# Patient Record
Sex: FEMALE | Race: WHITE | NOT HISPANIC OR LATINO | Employment: FULL TIME | URBAN - METROPOLITAN AREA
[De-identification: names, ages, dates, MRNs, and addresses within clinical notes are randomized per-mention and may not be internally consistent; named-entity substitution may affect disease eponyms.]

---

## 2017-03-13 ENCOUNTER — OFFICE VISIT (OUTPATIENT)
Dept: MEDICAL GROUP | Facility: LAB | Age: 27
End: 2017-03-13
Payer: COMMERCIAL

## 2017-03-13 VITALS
WEIGHT: 136 LBS | HEIGHT: 62 IN | SYSTOLIC BLOOD PRESSURE: 104 MMHG | DIASTOLIC BLOOD PRESSURE: 72 MMHG | OXYGEN SATURATION: 97 % | BODY MASS INDEX: 25.03 KG/M2 | HEART RATE: 80 BPM | TEMPERATURE: 98.6 F | RESPIRATION RATE: 12 BRPM

## 2017-03-13 DIAGNOSIS — Z32.01 POSITIVE PREGNANCY TEST: ICD-10-CM

## 2017-03-13 PROCEDURE — 99213 OFFICE O/P EST LOW 20 MIN: CPT | Performed by: NURSE PRACTITIONER

## 2017-03-13 ASSESSMENT — PATIENT HEALTH QUESTIONNAIRE - PHQ9: CLINICAL INTERPRETATION OF PHQ2 SCORE: 0

## 2017-03-13 NOTE — PROGRESS NOTES
"Chief Complaint   Patient presents with   • Amenorrhea     patient took a home pregnancy test with positive results,       CRISTY Schaeffer is a 25 yo est female here with report of a positive home pregnancy test.  This will be her 2nd pregnancy, she is 2 years post partum with a healthy girl. She is concerned that her OBGYN can not get her in until 5/16/2017.  She is having morning sickness symptoms of nausea and occasional vomiting but reports this is more mild compared to her first pregnancy.  She uses sweta gum for her nausea which helps.  Fatigue is present but mild.  Denies abdominal pain, vaginal bleeding or cramping.  LMP was 1/17/2017 and she had a positive home pregnancy test last week.  Overall states that she is feeling pretty well. She does not take any prescription medication, is a nonsmoker and avoids alcohol. She feels comfortable with what she can eat, drink and what indications over-the-counter she can take in pregnancy. She did not have any complications during her first pregnancy.    Past medical, surgical, family, and social history is reviewed and updated in Epic chart by me today.   Medications and allergies reviewed and updated in Epic chart by me today.     ROS:   As documented in history of present illness above    Exam:  Blood pressure 104/72, pulse 80, temperature 37 °C (98.6 °F), resp. rate 12, height 1.575 m (5' 2\"), weight 61.689 kg (136 lb), last menstrual period 07/07/2016, SpO2 97 %, unknown if currently breastfeeding.  Constitutional: Alert, no distress, plus 3 vital signs  Skin:  Warm, dry, no rashes invisible areas  Eye: Equal, round and reactive, conjunctiva clear  ENMT: Lips without lesions, good dentition, oropharynx clear    Neck: Trachea midline  Respiratory: Unlabored respiration  Cardiovascular: Normal rate and rhythm  Abdomen: Soft, nontender  Psych: Alert, pleasant, well-groomed, normal affect    A/P:  #1- pregnancy:  Concerned regarding appt being in mid May, making her " 17 weeks pregnant at her first appointment. I did call over to [facility] and they confirmed that they cannot see her until May 16 but she is on the waiting list. We reviewed diet, prenatal vitamins and acceptable over-the-counter medications during pregnancy. We also discussed concerning symptoms during pregnancy such as abdominal pain, vaginal bleeding, high fever. She does have access to me via email. She states that she may wait on her appointment in May but is considering reaching out to a different OB/GYN office as it does make her nervous to wait until she is 17 weeks pregnant to see an OB/GYN.  Pregnancy test was not confirmed in our office today as we do not have in office lab privileges yet.

## 2017-03-13 NOTE — MR AVS SNAPSHOT
"Laury Kaminskiparviz   3/13/2017 11:20 AM   Office Visit   MRN: [id number]    Department:  [facility]   Dept Phone:  [phone]    Description:  Female : 1990   Provider:  DENI Arredondo           Reason for Visit     Amenorrhea patient took a home pregnancy test with positive results,      Allergies as of 3/13/2017     Allergen Noted Reactions    Benadryl Allergy 2014   Hives    Pcn [Penicillins] 2009   Vomiting    Promethazine 2012         You were diagnosed with     Positive pregnancy test   [592242]         Vital Signs     Blood Pressure Pulse Temperature Respirations Height Weight    104/72 mmHg 80 37 °C (98.6 °F) 12 1.575 m (5' 2\") 61.689 kg (136 lb)    Body Mass Index Oxygen Saturation Last Menstrual Period Smoking Status          24.87 kg/m2 97% 2016 Never Smoker         Basic Information     Date Of Birth Sex Race Ethnicity Preferred Language    1990 Female White Non- English      Your appointments     May 16, 2017  9:30 AM   Established Patient with Triny Alvarez M.D.   [facility] ([address])    [address] [place] [address], [address]  [facility] [address] [phone]              Problem List              ICD-10-CM Priority Class Noted - Resolved    Shingles B02.9   Unknown - Present    Supervision of normal first pregnancy-IOL 50 9 AM Z34.00   2014 - Present    Asthma, mild persistent J45.30   10/23/2014 - Present      Health Maintenance        Date Due Completion Dates    IMM HEP B VACCINE (1 of 3 - Primary Series) 1990 ---    IMM HEP A VACCINE (1 of 2 - Standard Series) 4/10/1991 ---    IMM VARICELLA (CHICKENPOX) VACCINE (1 of 2 - 2 Dose Adolescent Series) 4/10/2003 ---    IMM HPV VACCINE (2 of 3 - Female 3 Dose Series) 2008    IMM INFLUENZA (1) 2016 ---    PAP SMEAR 2019, 2014, 2012    IMM DTaP/Tdap/Td Vaccine (2 - Td) 2024            "   Current Immunizations     HPV Quadrivalent Vaccine (GARDASIL) 4/25/2008    Pneumococcal polysaccharide vaccine (PPSV-23) 2/11/2015  4:56 AM    Tdap Vaccine 11/12/2014      Below and/or attached are the medications your provider expects you to take. Review all of your home medications and newly ordered medications with your provider and/or pharmacist. Follow medication instructions as directed by your provider and/or pharmacist. Please keep your medication list with you and share with your provider. Update the information when medications are discontinued, doses are changed, or new medications (including over-the-counter products) are added; and carry medication information at all times in the event of emergency situations     Allergies:  BENADRYL ALLERGY - Hives     PCN - Vomiting     PROMETHAZINE - (reactions not documented)               Medications  Valid as of: March 13, 2017 - 12:04 PM    Generic Name Brand Name Tablet Size Instructions for use    Albuterol Sulfate (Nebu Soln) PROVENTIL 2.5mg/3ml 3 ML BY NEBULIZATION ROUTE EVERY FOUR HOURS AS NEEDED FOR SHORTNESS OF BREATH.        Ibuprofen (Tab) MOTRIN 600 MG Take 1 Tab by mouth every 6 hours as needed (Cramping).        Levalbuterol Tartrate (Aerosol) XOPENEX HFA 45 MCG/ACT Inhale 2 Puffs by mouth every four hours as needed for Shortness of Breath.        Levonorgestrel-Ethinyl Estrad (Tab) AVIANE, ALESSE, LESSINA 0.1-20 MG-MCG Take 1 Tab by mouth every day.        Levonorgestrel-Ethinyl Estrad (Tab) NORDETTE 0.15-30 MG-MCG Take 1 Tab by mouth every day.        Misc. Devices (Misc) Breast Pump  Breast Pump        Norethindrone (Tab) MICRONOR 0.35 MG Take 1 Tab by mouth every day. TAKE 1 TAB BY MOUTH EVERY DAY.        Oxycodone-Acetaminophen (Tab) PERCOCET 5-325 MG Take 1 Tab by mouth every four hours as needed for Moderate Pain ((Pain Scale 4-6); If acetaminophen ineffective).        .                 Medicines prescribed today were sent to:      [facility][id number] - [place], [place] - [address] [facility]    [address] [facility] [address]    Phone: [phone] Fax: [phone]    Open 24 Hours?: No      Medication refill instructions:       If your prescription bottle indicates you have medication refills left, it is not necessary to call your provider’s office. Please contact your pharmacy and they will refill your medication.    If your prescription bottle indicates you do not have any refills left, you may request refills at any time through one of the following ways: The online [employer] system (except Urgent Care), by calling your provider’s office, or by asking your pharmacy to contact your provider’s office with a refill request. Medication refills are processed only during regular business hours and may not be available until the next business day. Your provider may request additional information or to have a follow-up visit with you prior to refilling your medication.   *Please Note: Medication refills are assigned a new Rx number when refilled electronically. Your pharmacy may indicate that no refills were authorized even though a new prescription for the same medication is available at the pharmacy. Please request the medicine by name with the pharmacy before contacting your provider for a refill.           [employer] Access Code: Activation code not generated  Current [employer] Status: Active

## 2017-04-10 ENCOUNTER — HOSPITAL ENCOUNTER (OUTPATIENT)
Dept: LAB | Facility: MEDICAL CENTER | Age: 27
End: 2017-04-10
Attending: OBSTETRICS & GYNECOLOGY
Payer: COMMERCIAL

## 2017-04-10 LAB
ABO GROUP BLD: NORMAL
BASOPHILS # BLD AUTO: 0.4 % (ref 0–1.8)
BASOPHILS # BLD: 0.03 K/UL (ref 0–0.12)
BLD GP AB SCN SERPL QL: NORMAL
EOSINOPHIL # BLD AUTO: 0.17 K/UL (ref 0–0.51)
EOSINOPHIL NFR BLD: 2.4 % (ref 0–6.9)
ERYTHROCYTE [DISTWIDTH] IN BLOOD BY AUTOMATED COUNT: 44.5 FL (ref 35.9–50)
HBV SURFACE AG SER QL: NEGATIVE
HCT VFR BLD AUTO: 40.8 % (ref 37–47)
HGB BLD-MCNC: 13.5 G/DL (ref 12–16)
HIV 1+2 AB+HIV1 P24 AG SERPL QL IA: NON REACTIVE
IMM GRANULOCYTES # BLD AUTO: 0.05 K/UL (ref 0–0.11)
IMM GRANULOCYTES NFR BLD AUTO: 0.7 % (ref 0–0.9)
LYMPHOCYTES # BLD AUTO: 1.48 K/UL (ref 1–4.8)
LYMPHOCYTES NFR BLD: 21.3 % (ref 22–41)
MCH RBC QN AUTO: 29.7 PG (ref 27–33)
MCHC RBC AUTO-ENTMCNC: 33.1 G/DL (ref 33.6–35)
MCV RBC AUTO: 89.7 FL (ref 81.4–97.8)
MONOCYTES # BLD AUTO: 0.43 K/UL (ref 0–0.85)
MONOCYTES NFR BLD AUTO: 6.2 % (ref 0–13.4)
NEUTROPHILS # BLD AUTO: 4.79 K/UL (ref 2–7.15)
NEUTROPHILS NFR BLD: 69 % (ref 44–72)
NRBC # BLD AUTO: 0 K/UL
NRBC BLD AUTO-RTO: 0 /100 WBC
PLATELET # BLD AUTO: 155 K/UL (ref 164–446)
PMV BLD AUTO: 11.6 FL (ref 9–12.9)
RBC # BLD AUTO: 4.55 M/UL (ref 4.2–5.4)
RH BLD: NORMAL
RUBV AB SER QL: 159.4 IU/ML
TREPONEMA PALLIDUM IGG+IGM AB [PRESENCE] IN SERUM OR PLASMA BY IMMUNOASSAY: NON REACTIVE
WBC # BLD AUTO: 7 K/UL (ref 4.8–10.8)

## 2017-04-10 PROCEDURE — 86850 RBC ANTIBODY SCREEN: CPT

## 2017-04-10 PROCEDURE — 86901 BLOOD TYPING SEROLOGIC RH(D): CPT

## 2017-04-10 PROCEDURE — 86762 RUBELLA ANTIBODY: CPT

## 2017-04-10 PROCEDURE — 87389 HIV-1 AG W/HIV-1&-2 AB AG IA: CPT

## 2017-04-10 PROCEDURE — 86780 TREPONEMA PALLIDUM: CPT

## 2017-04-10 PROCEDURE — 85025 COMPLETE CBC W/AUTO DIFF WBC: CPT

## 2017-04-10 PROCEDURE — 87340 HEPATITIS B SURFACE AG IA: CPT

## 2017-04-10 PROCEDURE — 87086 URINE CULTURE/COLONY COUNT: CPT | Mod: 91

## 2017-04-10 PROCEDURE — 86900 BLOOD TYPING SEROLOGIC ABO: CPT

## 2017-04-12 LAB
BACTERIA UR CULT: NORMAL
SIGNIFICANT IND 70042: NORMAL
SOURCE SOURCE: NORMAL

## 2017-04-13 LAB
BACTERIA UR CULT: NORMAL
SIGNIFICANT IND 70042: NORMAL
SOURCE SOURCE: NORMAL

## 2017-05-23 ENCOUNTER — INITIAL PRENATAL (OUTPATIENT)
Dept: OBGYN | Facility: CLINIC | Age: 27
End: 2017-05-23
Payer: COMMERCIAL

## 2017-05-23 ENCOUNTER — HOSPITAL ENCOUNTER (OUTPATIENT)
Facility: MEDICAL CENTER | Age: 27
End: 2017-05-23
Attending: OBSTETRICS & GYNECOLOGY
Payer: COMMERCIAL

## 2017-05-23 VITALS — DIASTOLIC BLOOD PRESSURE: 62 MMHG | SYSTOLIC BLOOD PRESSURE: 102 MMHG | WEIGHT: 148 LBS | BODY MASS INDEX: 27.06 KG/M2

## 2017-05-23 DIAGNOSIS — Z34.82 ENCOUNTER FOR SUPERVISION OF OTHER NORMAL PREGNANCY IN SECOND TRIMESTER: ICD-10-CM

## 2017-05-23 PROCEDURE — 87491 CHLMYD TRACH DNA AMP PROBE: CPT

## 2017-05-23 PROCEDURE — 59401 PR NEW OB VISIT: CPT | Performed by: OBSTETRICS & GYNECOLOGY

## 2017-05-23 PROCEDURE — 87591 N.GONORRHOEAE DNA AMP PROB: CPT

## 2017-05-23 NOTE — MR AVS SNAPSHOT
Laury Flores   2017 10:00 AM   Initial Prenatal   MRN: [id number]    Department:  [facility]   Dept Phone:  [phone]    Description:  Female : 1990   Provider:  Triny Alvarez M.D.           Allergies as of 2017     Allergen Noted Reactions    Benadryl Allergy 2014   Hives    Pcn [Penicillins] 2009   Vomiting    Promethazine 2012         You were diagnosed with     Encounter for supervision of other normal pregnancy in second trimester   [[id number]]         Vital Signs     Blood Pressure Weight Smoking Status             102/62 mmHg 67.132 kg (148 lb) Never Smoker          Basic Information     Date Of Birth Sex Race Ethnicity Preferred Language    1990 Female White Non- English      Your appointments     2017 10:30 AM   OB Follow Up with Triny Alvarez M.D.   [address])    [address] [place] [id number] [phone]            2017 10:00 AM   OB Follow Up with Triny Alvarez M.D.   [facility] ([address])    [address] [place] [address], [address] [phone]            Aug 15, 2017 10:00 AM   OB Follow Up with Triny Alvarez M.D.   [address])    [address] [place] [id number] [phone]              Problem List              ICD-10-CM Priority Class Noted - Resolved    Shingles B02.9   Unknown - Present    Supervision of normal first pregnancy-IOL 50 9 AM Z34.00   2014 - Present    Asthma, mild persistent J45.30   10/23/2014 - Present      Health Maintenance        Date Due Completion Dates    IMM HEP B VACCINE (1 of 3 - Primary Series) 1990 ---    IMM HEP A VACCINE (1 of 2 - Standard Series) 4/10/1991 ---    IMM VARICELLA (CHICKENPOX) VACCINE (1 of 2 - 2 Dose Adolescent Series) 4/10/2003 ---    IMM HPV VACCINE (2 of 3 - Female 3 Dose Series)  6/20/2008 4/25/2008    PAP SMEAR 7/8/2019 7/8/2016, 7/29/2014, 1/17/2012    IMM DTaP/Tdap/Td Vaccine (2 - Td) 11/12/2024 11/12/2014            Current Immunizations     HPV Quadrivalent Vaccine (GARDASIL) 4/25/2008    Pneumococcal polysaccharide vaccine (PPSV-23) 2/11/2015  4:56 AM    Tdap Vaccine 11/12/2014      Below and/or attached are the medications your provider expects you to take. Review all of your home medications and newly ordered medications with your provider and/or pharmacist. Follow medication instructions as directed by your provider and/or pharmacist. Please keep your medication list with you and share with your provider. Update the information when medications are discontinued, doses are changed, or new medications (including over-the-counter products) are added; and carry medication information at all times in the event of emergency situations     Allergies:  BENADRYL ALLERGY - Hives     PCN - Vomiting     PROMETHAZINE - (reactions not documented)               Medications  Valid as of: May 23, 2017 - 10:21 AM    Generic Name Brand Name Tablet Size Instructions for use    Albuterol Sulfate (Nebu Soln) PROVENTIL 2.5mg/3ml 3 ML BY NEBULIZATION ROUTE EVERY FOUR HOURS AS NEEDED FOR SHORTNESS OF BREATH.        Ibuprofen (Tab) MOTRIN 600 MG Take 1 Tab by mouth every 6 hours as needed (Cramping).        Levalbuterol Tartrate (Aerosol) XOPENEX HFA 45 MCG/ACT Inhale 2 Puffs by mouth every four hours as needed for Shortness of Breath.        Levonorgestrel-Ethinyl Estrad (Tab) AVIANE, ALESSE, LESSINA 0.1-20 MG-MCG Take 1 Tab by mouth every day.        Levonorgestrel-Ethinyl Estrad (Tab) NORDETTE 0.15-30 MG-MCG Take 1 Tab by mouth every day.        Misc. Devices (Misc) Breast Pump  Breast Pump        Norethindrone (Tab) MICRONOR 0.35 MG Take 1 Tab by mouth every day. TAKE 1 TAB BY MOUTH EVERY DAY.        Oxycodone-Acetaminophen (Tab) PERCOCET 5-325 MG Take 1 Tab by mouth every four hours as needed for  Moderate Pain ((Pain Scale 4-6); If acetaminophen ineffective).        .                 Medicines prescribed today were sent to:     [facility] #[id number] - [place], [place] - [address] [facility] [address]    [address] [facility] [address]    Phone: [phone] Fax: [phone]    Open 24 Hours?: No      Medication refill instructions:       If your prescription bottle indicates you have medication refills left, it is not necessary to call your provider’s office. Please contact your pharmacy and they will refill your medication.    If your prescription bottle indicates you do not have any refills left, you may request refills at any time through one of the following ways: The online [employer] system (except Urgent Care), by calling your provider’s office, or by asking your pharmacy to contact your provider’s office with a refill request. Medication refills are processed only during regular business hours and may not be available until the next business day. Your provider may request additional information or to have a follow-up visit with you prior to refilling your medication.   *Please Note: Medication refills are assigned a new Rx number when refilled electronically. Your pharmacy may indicate that no refills were authorized even though a new prescription for the same medication is available at the pharmacy. Please request the medicine by name with the pharmacy before contacting your provider for a refill.        Your To Do List     Future Labs/Procedures Complete By Expires    AFP TETRA  As directed 5/23/2018    Chlamydia/GC PCR Urine or Swab  As directed 5/23/2018    US-OB 2ND 3RD TRI COMPLETE  As directed 5/23/2018      Instructions    New ob            [employer] Access Code: Activation code not generated  Current [employer] Status: Active

## 2017-05-23 NOTE — PROGRESS NOTES
Pt here for NOB. No fetal movement yet. Denies LOF, VB.  [facility]  Per pt she has confirmation US and blood work. Will request records.  Needs pap + fetal anatomy scan

## 2017-05-23 NOTE — PROGRESS NOTES
Laury Flores is a 27 y.o.  at 18w4d here today for obstetrical visit.  Patient is without complaints.    She reports good fetal movement.  She denies vaginal bleeding.  She denies rupture of membranes.  She denies contractions.     has Shingles; Supervision of normal first pregnancy-IOL 50 9 AM; and Asthma, mild persistent on her problem list.    Past medical history is reviewed and updated  Past surgical history is reviewed and updated  Medications reviewed and updated  Allergies reviewed and updated  Social history reviewed and updated  Family history reviewed and updated      Heart regular rate and rhythm  Lungs clear to auscultation  Breasts nontender with no dominant masses  Abdomen gravid and soft  PELVIC EXAM:  Normal external genitalia  Spec: cervix has no lesions, minimal whitish discharge  Vaginal wall no lesions  BME: cervix - closed and firm, non-tender            Uterus -18 weeks, fetal heart tones 160s            Adnexae - no masses, non-tender      A/P IUP at 18w4d  AFP ordered  1 hour glucola   Rhogam o neg  GBS     F/U in 4 weeks

## 2017-05-24 ENCOUNTER — TELEPHONE (OUTPATIENT)
Dept: OBGYN | Facility: CLINIC | Age: 27
End: 2017-05-24

## 2017-05-24 LAB
C TRACH DNA SPEC QL NAA+PROBE: NEGATIVE
N GONORRHOEA DNA SPEC QL NAA+PROBE: NEGATIVE
SPECIMEN SOURCE: NORMAL

## 2017-05-30 ENCOUNTER — HOSPITAL ENCOUNTER (OUTPATIENT)
Dept: LAB | Facility: MEDICAL CENTER | Age: 27
End: 2017-05-30
Attending: OBSTETRICS & GYNECOLOGY
Payer: COMMERCIAL

## 2017-05-30 DIAGNOSIS — Z34.82 ENCOUNTER FOR SUPERVISION OF OTHER NORMAL PREGNANCY IN SECOND TRIMESTER: ICD-10-CM

## 2017-05-30 PROCEDURE — 81511 FTL CGEN ABNOR FOUR ANAL: CPT

## 2017-05-30 PROCEDURE — 36415 COLL VENOUS BLD VENIPUNCTURE: CPT

## 2017-06-02 LAB
# FETUSES US: NORMAL
AFP MOM SERPL: 1.41
AFP SERPL-MCNC: 76 NG/ML
AGE - REPORTED: 27.5 YR
GA METHOD: NORMAL
GA: 19.57 WEEKS
HCG MOM SERPL: 1.42
HCG SERPL-ACNC: NORMAL IU/L
IDDM PATIENT QL: NO
INHIBIN A MOM SERPL: 1.13
INHIBIN A SERPL-MCNC: 194 PG/ML
INTEGRATED SCN PATIENT-IMP: NORMAL
PATHOLOGY STUDY: NORMAL
U ESTRIOL MOM SERPL: 0.83
U ESTRIOL SERPL-MCNC: 1.67 NG/ML

## 2017-06-20 ENCOUNTER — ROUTINE PRENATAL (OUTPATIENT)
Dept: OBGYN | Facility: CLINIC | Age: 27
End: 2017-06-20
Payer: COMMERCIAL

## 2017-06-20 ENCOUNTER — HOSPITAL ENCOUNTER (OUTPATIENT)
Dept: RADIOLOGY | Facility: MEDICAL CENTER | Age: 27
End: 2017-06-20
Attending: OBSTETRICS & GYNECOLOGY
Payer: COMMERCIAL

## 2017-06-20 VITALS — SYSTOLIC BLOOD PRESSURE: 110 MMHG | WEIGHT: 154 LBS | DIASTOLIC BLOOD PRESSURE: 70 MMHG | BODY MASS INDEX: 28.16 KG/M2

## 2017-06-20 DIAGNOSIS — Z34.82 ENCOUNTER FOR SUPERVISION OF OTHER NORMAL PREGNANCY IN SECOND TRIMESTER: ICD-10-CM

## 2017-06-20 PROBLEM — Z34.92 ENCOUNTER FOR SUPERVISION OF NORMAL PREGNANCY IN SECOND TRIMESTER: Status: ACTIVE | Noted: 2017-06-20

## 2017-06-20 PROCEDURE — 90040 PR PRENATAL FOLLOW UP: CPT | Performed by: OBSTETRICS & GYNECOLOGY

## 2017-06-20 PROCEDURE — 76805 OB US >/= 14 WKS SNGL FETUS: CPT

## 2017-06-20 NOTE — MR AVS SNAPSHOT
Laury Flores   2017 10:30 AM   Routine Prenatal   MRN: [id number]    [facility]   Dept Phone:  [phone]    Description:  Female : 1990   Provider:  Triny Alvarez M.D.           Allergies as of 2017     Allergen Noted Reactions    Benadryl Allergy 2014   Hives    Pcn [Penicillins] 2009   Vomiting    Promethazine 2012         You were diagnosed with     Encounter for supervision of other normal pregnancy in second trimester   [[id number]]         Vital Signs     Blood Pressure Weight Last Menstrual Period Smoking Status          110/70 mmHg 69.854 kg (154 lb) 2017 (Exact Date) Never Smoker         Basic Information     Date Of Birth Sex Race Ethnicity Preferred Language    1990 Female White Non- English      Your appointments     2017 10:00 AM   OB Follow Up with Triny Alvarez M.D.   [facility] ([address])    [address] [place] [phone]            Aug 15, 2017  3:45 PM   OB Follow Up with Triny Alvarez M.D.   [facility] ([address])    [address] [place] [phone]              Problem List              ICD-10-CM Priority Class Noted - Resolved    Shingles B02.9   Unknown - Present    Asthma, mild persistent J45.30   10/23/2014 - Present    Encounter for supervision of normal pregnancy in second trimester Z34.92   2017 - Present      Health Maintenance        Date Due Completion Dates    IMM HEP B VACCINE (1 of 3 - Primary Series) 1990 ---    IMM HEP A VACCINE (1 of 2 - Standard Series) 4/10/1991 ---    IMM VARICELLA (CHICKENPOX) VACCINE (1 of 2 - 2 Dose Adolescent Series) 4/10/2003 ---    IMM HPV VACCINE (2 of 3 - Female 3 Dose Series) 2008    PAP SMEAR 2019, 2014, 2012    IMM DTaP/Tdap/Td Vaccine (2 - Td) 2024               Current Immunizations     HPV Quadrivalent Vaccine (GARDASIL) 4/25/2008    Pneumococcal polysaccharide vaccine (PPSV-23) 2/11/2015  4:56 AM    Tdap Vaccine 11/12/2014      Below and/or attached are the medications your provider expects you to take. Review all of your home medications and newly ordered medications with your provider and/or pharmacist. Follow medication instructions as directed by your provider and/or pharmacist. Please keep your medication list with you and share with your provider. Update the information when medications are discontinued, doses are changed, or new medications (including over-the-counter products) are added; and carry medication information at all times in the event of emergency situations     Allergies:  BENADRYL ALLERGY - Hives     PCN - Vomiting     PROMETHAZINE - (reactions not documented)               Medications  Valid as of: June 20, 2017 - 11:14 AM    Generic Name Brand Name Tablet Size Instructions for use    Albuterol Sulfate (Nebu Soln) PROVENTIL 2.5mg/3ml 3 ML BY NEBULIZATION ROUTE EVERY FOUR HOURS AS NEEDED FOR SHORTNESS OF BREATH.        Ibuprofen (Tab) MOTRIN 600 MG Take 1 Tab by mouth every 6 hours as needed (Cramping).        Levalbuterol Tartrate (Aerosol) XOPENEX HFA 45 MCG/ACT Inhale 2 Puffs by mouth every four hours as needed for Shortness of Breath.        Levonorgestrel-Ethinyl Estrad (Tab) AVIANE, ALESSE, LESSINA 0.1-20 MG-MCG Take 1 Tab by mouth every day.        Levonorgestrel-Ethinyl Estrad (Tab) NORDETTE 0.15-30 MG-MCG Take 1 Tab by mouth every day.        Misc. Devices (Misc) Breast Pump  Breast Pump        Norethindrone (Tab) MICRONOR 0.35 MG Take 1 Tab by mouth every day. TAKE 1 TAB BY MOUTH EVERY DAY.        Oxycodone-Acetaminophen (Tab) PERCOCET 5-325 MG Take 1 Tab by mouth every four hours as needed for Moderate Pain ((Pain Scale 4-6); If acetaminophen ineffective).        .                 Medicines prescribed today were sent to:     [id number] - [place], [place] - [address]    [address] [facility] [address]    Phone: [phone] Fax: [phone]    Open 24 Hours?: No      Medication refill instructions:       If your prescription bottle indicates you have medication refills left, it is not necessary to call your provider’s office. Please contact your pharmacy and they will refill your medication.    If your prescription bottle indicates you do not have any refills left, you may request refills at any time through one of the following ways: The online [employer] system (except Urgent Care), by calling your provider’s office, or by asking your pharmacy to contact your provider’s office with a refill request. Medication refills are processed only during regular business hours and may not be available until the next business day. Your provider may request additional information or to have a follow-up visit with you prior to refilling your medication.   *Please Note: Medication refills are assigned a new Rx number when refilled electronically. Your pharmacy may indicate that no refills were authorized even though a new prescription for the same medication is available at the pharmacy. Please request the medicine by name with the pharmacy before contacting your provider for a refill.           [employer] Access Code: Activation code not generated  Current [employer] Status: Active

## 2017-06-21 ENCOUNTER — DATING (OUTPATIENT)
Dept: OBGYN | Facility: CLINIC | Age: 27
End: 2017-06-21

## 2017-07-18 ENCOUNTER — ROUTINE PRENATAL (OUTPATIENT)
Dept: OBGYN | Facility: CLINIC | Age: 27
End: 2017-07-18
Payer: COMMERCIAL

## 2017-07-18 ENCOUNTER — TELEPHONE (OUTPATIENT)
Dept: OBGYN | Facility: CLINIC | Age: 27
End: 2017-07-18

## 2017-07-18 VITALS
WEIGHT: 160.4 LBS | HEIGHT: 62 IN | SYSTOLIC BLOOD PRESSURE: 108 MMHG | BODY MASS INDEX: 29.52 KG/M2 | DIASTOLIC BLOOD PRESSURE: 60 MMHG

## 2017-07-18 DIAGNOSIS — Z34.82 ENCOUNTER FOR SUPERVISION OF OTHER NORMAL PREGNANCY IN SECOND TRIMESTER: ICD-10-CM

## 2017-07-18 DIAGNOSIS — O44.02 PLACENTA PREVIA ANTEPARTUM IN SECOND TRIMESTER: ICD-10-CM

## 2017-07-18 PROCEDURE — 90040 PR PRENATAL FOLLOW UP: CPT | Performed by: OBSTETRICS & GYNECOLOGY

## 2017-07-18 NOTE — MR AVS SNAPSHOT
"Laury Flores   2017 10:00 AM   Routine Prenatal   MRN: [id number]    Department:  [facility]   Dept Phone:  [phone]    Description:  Female : 1990   Provider:  Triny Alvarez M.D.           Allergies as of 2017     Allergen Noted Reactions    Benadryl Allergy 2014   Hives    Pcn [Penicillins] 2009   Vomiting    Promethazine 2012         You were diagnosed with     Encounter for supervision of other normal pregnancy in second trimester   [8227470]       Placenta previa antepartum in second trimester   [[id number]]         Vital Signs     Blood Pressure Height Weight Body Mass Index Last Menstrual Period Smoking Status    108/60 mmHg 1.575 m (5' 2\") 72.757 kg (160 lb 6.4 oz) 29.33 kg/m2 2017 (Exact Date) Never Smoker       Basic Information     Date Of Birth Sex Race Ethnicity Preferred Language    1990 Female White Non- English      Your appointments     Aug 01, 2017  9:00 AM   [facility] Non Provider with OBGYN E2 [facility] ([address])    [address] [place] [address], [address]  [facility] [id number] [phone]            Aug 15, 2017  3:45 PM   OB Follow Up with Triny Alvarez M.D.   [facility] ([address])    [address] [place] [address], [address]  [facility] [id number] [phone]              Problem List              ICD-10-CM Priority Class Noted - Resolved    Shingles B02.9   Unknown - Present    Asthma, mild persistent J45.30   10/23/2014 - Present    Encounter for supervision of normal pregnancy in second trimester Z34.92   2017 - Present    Placenta previa antepartum in second trimester O44.02   2017 - Present      Health Maintenance        Date Due Completion Dates    IMM HEP B VACCINE (1 of 3 - Primary Series) 1990 ---    IMM HEP A VACCINE (1 of 2 - Standard Series) 4/10/1991 ---    IMM VARICELLA (CHICKENPOX) VACCINE (1 of 2 - 2 Dose Adolescent Series) 4/10/2003 ---    IMM " HPV VACCINE (2 of 3 - Female 3 Dose Series) 6/20/2008 4/25/2008    IMM INFLUENZA (1) 9/1/2017 ---    PAP SMEAR 7/8/2019 7/8/2016, 7/29/2014, 1/17/2012    IMM DTaP/Tdap/Td Vaccine (2 - Td) 11/12/2024 11/12/2014            Current Immunizations     HPV Quadrivalent Vaccine (GARDASIL) 4/25/2008    Pneumococcal polysaccharide vaccine (PPSV-23) 2/11/2015  4:56 AM    Tdap Vaccine 11/12/2014      Below and/or attached are the medications your provider expects you to take. Review all of your home medications and newly ordered medications with your provider and/or pharmacist. Follow medication instructions as directed by your provider and/or pharmacist. Please keep your medication list with you and share with your provider. Update the information when medications are discontinued, doses are changed, or new medications (including over-the-counter products) are added; and carry medication information at all times in the event of emergency situations     Allergies:  BENADRYL ALLERGY - Hives     PCN - Vomiting     PROMETHAZINE - (reactions not documented)               Medications  Valid as of: July 18, 2017 - 10:41 AM    Generic Name Brand Name Tablet Size Instructions for use    Albuterol Sulfate (Nebu Soln) PROVENTIL 2.5mg/3ml 3 ML BY NEBULIZATION ROUTE EVERY FOUR HOURS AS NEEDED FOR SHORTNESS OF BREATH.        Ibuprofen (Tab) MOTRIN 600 MG Take 1 Tab by mouth every 6 hours as needed (Cramping).        Levalbuterol Tartrate (Aerosol) XOPENEX HFA 45 MCG/ACT Inhale 2 Puffs by mouth every four hours as needed for Shortness of Breath.        Levonorgestrel-Ethinyl Estrad (Tab) AVIANE, ALESSE, LESSINA 0.1-20 MG-MCG Take 1 Tab by mouth every day.        Levonorgestrel-Ethinyl Estrad (Tab) NORDETTE 0.15-30 MG-MCG Take 1 Tab by mouth every day.        Misc. Devices (Misc) Breast Pump  Breast Pump        Norethindrone (Tab) MICRONOR 0.35 MG Take 1 Tab by mouth every day. TAKE 1 TAB BY MOUTH EVERY DAY.        Oxycodone-Acetaminophen  (Tab) PERCOCET 5-325 MG Take 1 Tab by mouth every four hours as needed for Moderate Pain ((Pain Scale 4-6); If acetaminophen ineffective).        .                 Medicines prescribed today were sent to:     [facility]/PHARMACY #[id number] - [facility] [place] - [address]    [address] [address]    Phone: [phone] Fax: [phone]    Open 24 Hours?: No      Medication refill instructions:       If your prescription bottle indicates you have medication refills left, it is not necessary to call your provider’s office. Please contact your pharmacy and they will refill your medication.    If your prescription bottle indicates you do not have any refills left, you may request refills at any time through one of the following ways: The online [employer] system (except Urgent Care), by calling your provider’s office, or by asking your pharmacy to contact your provider’s office with a refill request. Medication refills are processed only during regular business hours and may not be available until the next business day. Your provider may request additional information or to have a follow-up visit with you prior to refilling your medication.   *Please Note: Medication refills are assigned a new Rx number when refilled electronically. Your pharmacy may indicate that no refills were authorized even though a new prescription for the same medication is available at the pharmacy. Please request the medicine by name with the pharmacy before contacting your provider for a refill.        Your To Do List     Future Labs/Procedures Complete By Expires    CBC WITHOUT DIFFERENTIAL  As directed 1/15/2018    T.PALLIDUM AB EIA  As directed 7/18/2018         Hub[employer]illa Access Code: Activation code not generated  Current Hub[employer]illa Status: Active

## 2017-07-18 NOTE — PROGRESS NOTES
Laury Flores is a 27 y.o.  at 25w6d here today for obstetrical visit.  Patient is without complaints.    She reports good fetal movement.  She denies vaginal bleeding.  She denies rupture of membranes.  She denies contractions.     has Shingles; Asthma, mild persistent; and Encounter for supervision of normal pregnancy in second trimester on her problem list.    Review of US partial previa written in findings but not impression will have radiology re-read for clarification  Addendum from ultrasound report shows low-lying placenta within 1 cm cervical  D/c with patient pelvic rest and possible repeat US  Needs rhogam in two weeks    A/P IUP at 25w6d  AFP done  1 hour glucola ordered  Rhogam 2 weeks  GBS   F/U in 4 weeks

## 2017-07-19 NOTE — TELEPHONE ENCOUNTER
Per Dr. Alvarez, I called the pt and explained she needs to be on pelvic rest, US in 3 weeks, no placenta previa but low lying. Also told pt about 24 week labs for gdm. Pt agrees and will have labs done tomorrow and call to schedule US

## 2017-07-25 ENCOUNTER — HOSPITAL ENCOUNTER (OUTPATIENT)
Dept: LAB | Facility: MEDICAL CENTER | Age: 27
End: 2017-07-25
Attending: OBSTETRICS & GYNECOLOGY
Payer: COMMERCIAL

## 2017-07-25 DIAGNOSIS — Z34.82 ENCOUNTER FOR SUPERVISION OF OTHER NORMAL PREGNANCY IN SECOND TRIMESTER: ICD-10-CM

## 2017-07-25 LAB
ERYTHROCYTE [DISTWIDTH] IN BLOOD BY AUTOMATED COUNT: 39.9 FL (ref 35.9–50)
HCT VFR BLD AUTO: 37 % (ref 37–47)
HGB BLD-MCNC: 11.9 G/DL (ref 12–16)
MCH RBC QN AUTO: 28.8 PG (ref 27–33)
MCHC RBC AUTO-ENTMCNC: 32.2 G/DL (ref 33.6–35)
MCV RBC AUTO: 89.6 FL (ref 81.4–97.8)
PLATELET # BLD AUTO: 138 K/UL (ref 164–446)
PMV BLD AUTO: 10.7 FL (ref 9–12.9)
RBC # BLD AUTO: 4.13 M/UL (ref 4.2–5.4)
TREPONEMA PALLIDUM IGG+IGM AB [PRESENCE] IN SERUM OR PLASMA BY IMMUNOASSAY: NON REACTIVE
WBC # BLD AUTO: 9.3 K/UL (ref 4.8–10.8)

## 2017-07-25 PROCEDURE — 82950 GLUCOSE TEST: CPT

## 2017-07-25 PROCEDURE — 85027 COMPLETE CBC AUTOMATED: CPT

## 2017-07-25 PROCEDURE — 86780 TREPONEMA PALLIDUM: CPT

## 2017-07-25 PROCEDURE — 36415 COLL VENOUS BLD VENIPUNCTURE: CPT

## 2017-07-26 LAB — GLUCOSE 1H P 50 G GLC PO SERPL-MCNC: 145 MG/DL (ref 70–139)

## 2017-07-28 ENCOUNTER — TELEPHONE (OUTPATIENT)
Dept: OBGYN | Facility: CLINIC | Age: 27
End: 2017-07-28

## 2017-07-31 ENCOUNTER — TELEPHONE (OUTPATIENT)
Dept: OBGYN | Facility: MEDICAL CENTER | Age: 27
End: 2017-07-31

## 2017-07-31 DIAGNOSIS — O99.810 ABNORMAL GLUCOSE TOLERANCE IN PREGNANCY: ICD-10-CM

## 2017-07-31 NOTE — TELEPHONE ENCOUNTER
----- Message from Triny Alvarez M.D. sent at 7/28/2017 12:11 AM PDT -----  Patient needs three-hour GTT

## 2017-08-01 ENCOUNTER — APPOINTMENT (OUTPATIENT)
Dept: OBGYN | Facility: CLINIC | Age: 27
End: 2017-08-01
Payer: COMMERCIAL

## 2017-08-02 ENCOUNTER — NON-PROVIDER VISIT (OUTPATIENT)
Dept: OBGYN | Facility: CLINIC | Age: 27
End: 2017-08-02
Payer: COMMERCIAL

## 2017-08-02 DIAGNOSIS — Z29.13 NEED FOR RHOGAM DUE TO RH NEGATIVE MOTHER: ICD-10-CM

## 2017-08-07 ENCOUNTER — HOSPITAL ENCOUNTER (OUTPATIENT)
Dept: RADIOLOGY | Facility: MEDICAL CENTER | Age: 27
End: 2017-08-07
Attending: OBSTETRICS & GYNECOLOGY
Payer: COMMERCIAL

## 2017-08-07 ENCOUNTER — TELEPHONE (OUTPATIENT)
Dept: OBGYN | Facility: CLINIC | Age: 27
End: 2017-08-07

## 2017-08-07 DIAGNOSIS — O44.02 PLACENTA PREVIA ANTEPARTUM IN SECOND TRIMESTER: ICD-10-CM

## 2017-08-07 PROCEDURE — 76817 TRANSVAGINAL US OBSTETRIC: CPT

## 2017-08-08 ENCOUNTER — TELEPHONE (OUTPATIENT)
Dept: OBGYN | Facility: CLINIC | Age: 27
End: 2017-08-08

## 2017-08-08 ENCOUNTER — HOSPITAL ENCOUNTER (OUTPATIENT)
Dept: LAB | Facility: MEDICAL CENTER | Age: 27
End: 2017-08-08
Attending: OBSTETRICS & GYNECOLOGY
Payer: COMMERCIAL

## 2017-08-08 DIAGNOSIS — O99.810 ABNORMAL GLUCOSE TOLERANCE IN PREGNANCY: ICD-10-CM

## 2017-08-08 LAB
GLUCOSE 1H P CHAL SERPL-MCNC: 153 MG/DL (ref 65–180)
GLUCOSE 2H P CHAL SERPL-MCNC: 156 MG/DL (ref 65–155)
GLUCOSE 3H P CHAL SERPL-MCNC: 140 MG/DL (ref 65–140)
GLUCOSE BS SERPL-MCNC: 81 MG/DL (ref 65–95)

## 2017-08-08 PROCEDURE — 36415 COLL VENOUS BLD VENIPUNCTURE: CPT

## 2017-08-08 PROCEDURE — 82951 GLUCOSE TOLERANCE TEST (GTT): CPT

## 2017-08-08 PROCEDURE — 82952 GTT-ADDED SAMPLES: CPT

## 2017-08-09 NOTE — TELEPHONE ENCOUNTER
Called and spoke with the pt and informed her of normal US findings. Pt was satisfied. Had no further questions or concerns at this time.

## 2017-08-15 ENCOUNTER — ROUTINE PRENATAL (OUTPATIENT)
Dept: OBGYN | Facility: CLINIC | Age: 27
End: 2017-08-15
Payer: COMMERCIAL

## 2017-08-15 VITALS
HEIGHT: 62 IN | BODY MASS INDEX: 30.33 KG/M2 | DIASTOLIC BLOOD PRESSURE: 68 MMHG | WEIGHT: 164.8 LBS | SYSTOLIC BLOOD PRESSURE: 112 MMHG

## 2017-08-15 DIAGNOSIS — Z34.82 ENCOUNTER FOR SUPERVISION OF OTHER NORMAL PREGNANCY IN SECOND TRIMESTER: ICD-10-CM

## 2017-08-15 DIAGNOSIS — O44.02 PLACENTA PREVIA ANTEPARTUM IN SECOND TRIMESTER: ICD-10-CM

## 2017-08-15 PROCEDURE — 90040 PR PRENATAL FOLLOW UP: CPT | Performed by: OBSTETRICS & GYNECOLOGY

## 2017-08-15 PROCEDURE — 90471 IMMUNIZATION ADMIN: CPT | Performed by: OBSTETRICS & GYNECOLOGY

## 2017-08-15 PROCEDURE — 90715 TDAP VACCINE 7 YRS/> IM: CPT | Performed by: OBSTETRICS & GYNECOLOGY

## 2017-08-15 NOTE — Clinical Note
"Count Your Baby's Movements  Another step to a healthy delivery    [facility]             Dept: [phone]    How Many Weeks Pregnant? 29w6d    Date to Begin Countin/15/17            How to use this chart    One way for your physician to keep track of your baby's health is by knowing how often the baby moves (or \"kicks\") in your womb.  You can help your physician to do this by using this chart every day.    Every day, you should see how many hours it takes for your baby to move 10 times.  Start in the morning, as soon as you get up.    · First, write down the time your baby moves until you get to 10.  · Check off one box every time your baby moves until you get to 10.  · Write down the time you finished counting in the last column.  · Total how long it took to count up all 10 movements.  · Finally, fill in the box that shows how long this took.  After counting 10 movements, you no longer have to count any more that day.  The next morning, just start counting again as soon as you get up.    What should you call a \"movement\"?  It is hard to say, because it will feel different from one mother to another and from one pregnancy to the next.  The important thing is that you count the movements the same way throughout your pregnancy.  If you have more questions, you should ask your physician.    Count carefully every day!  SAMPLE:  Week 28    How many hours did it take to feel 10 movements?       Start  Time     1     2     3     4     5     6     7     8     9     10   Finish Time   Mon 8:20 ·  ·  ·  ·  ·  ·  ·  ·  ·  ·  11:40                  Sat               Sun                 IMPORTANT: You should contact your physician if it takes more than two hours for you to feel 10 movements.  Each morning, write down the time and start to count the movements of your baby.  Keep track by checking off one box every time you feel one movement.  When you have " "felt 10 \"kicks\", write down the time you finished counting in the last column.  Then fill in the   box (over the check tamela) for the number of hours it took.  Be sure to read the complete instructions on the previous page.            "

## 2017-08-15 NOTE — PROGRESS NOTES
Laury Flores is a 27 y.o.  at 29w6d here today for obstetrical visit.  Patient is without complaints.    She reports good fetal movement.  She denies vaginal bleeding.  She denies rupture of membranes.  She denies contractions.     has Shingles; Asthma, mild persistent; Encounter for supervision of normal pregnancy in second trimester; and Placenta previa antepartum in second trimester on her problem list.        A/P IUP at 29w6d  AFP done  1 hour glucola normal 3 hour  Rhogam o neg  GBS     F/U in 2 weeks

## 2017-08-15 NOTE — MR AVS SNAPSHOT
"        Laury Flores   8/15/2017 3:45 PM   Routine Prenatal   MRN: [id number]    Department:  [facility]   Dept Phone:  [phone]    Description:  Female : 1990   Provider:  Triny Alvarez M.D.           Allergies as of 8/15/2017     Allergen Noted Reactions    Benadryl Allergy 2014   Hives    Pcn [Penicillins] 2009   Vomiting    Promethazine 2012         You were diagnosed with     Placenta previa antepartum in second trimester   [[id number]]       Encounter for supervision of other normal pregnancy in second trimester   [[id number]]         Vital Signs     Blood Pressure Height Weight Body Mass Index Last Menstrual Period Smoking Status    112/68 mmHg 1.575 m (5' 2\") 74.753 kg (164 lb 12.8 oz) 30.13 kg/m2 2017 (Exact Date) Never Smoker       Basic Information     Date Of Birth Sex Race Ethnicity Preferred Language    1990 Female White Non- English      Problem List              ICD-10-CM Priority Class Noted - Resolved    Shingles B02.9   Unknown - Present    Asthma, mild persistent J45.30   10/23/2014 - Present    Encounter for supervision of normal pregnancy in second trimester Z34.92   2017 - Present    Placenta previa antepartum in second trimester O44.02   2017 - Present      Health Maintenance        Date Due Completion Dates    IMM HEP B VACCINE (1 of 3 - Primary Series) 1990 ---    IMM HEP A VACCINE (1 of 2 - Standard Series) 4/10/1991 ---    IMM VARICELLA (CHICKENPOX) VACCINE (1 of 2 - 2 Dose Adolescent Series) 4/10/2003 ---    IMM HPV VACCINE (2 of 3 - Female 3 Dose Series) 2008    IMM INFLUENZA (1) 2017 ---    PAP SMEAR 2019, 2014, 2012    IMM DTaP/Tdap/Td Vaccine (2 - Td) 2024            Current Immunizations     HPV Quadrivalent Vaccine (GARDASIL) 2008    Pneumococcal polysaccharide vaccine (PPSV-23) 2015  4:56 AM    Tdap Vaccine 2014      Below " and/or attached are the medications your provider expects you to take. Review all of your home medications and newly ordered medications with your provider and/or pharmacist. Follow medication instructions as directed by your provider and/or pharmacist. Please keep your medication list with you and share with your provider. Update the information when medications are discontinued, doses are changed, or new medications (including over-the-counter products) are added; and carry medication information at all times in the event of emergency situations     Allergies:  BENADRYL ALLERGY - Hives     PCN - Vomiting     PROMETHAZINE - (reactions not documented)               Medications  Valid as of: August 15, 2017 -  4:39 PM    Generic Name Brand Name Tablet Size Instructions for use    Albuterol Sulfate (Nebu Soln) PROVENTIL 2.5mg/3ml 3 ML BY NEBULIZATION ROUTE EVERY FOUR HOURS AS NEEDED FOR SHORTNESS OF BREATH.        Ibuprofen (Tab) MOTRIN 600 MG Take 1 Tab by mouth every 6 hours as needed (Cramping).        Levalbuterol Tartrate (Aerosol) XOPENEX HFA 45 MCG/ACT Inhale 2 Puffs by mouth every four hours as needed for Shortness of Breath.        Levonorgestrel-Ethinyl Estrad (Tab) AVIANE, ALESSE, LESSINA 0.1-20 MG-MCG Take 1 Tab by mouth every day.        Levonorgestrel-Ethinyl Estrad (Tab) NORDETTE 0.15-30 MG-MCG Take 1 Tab by mouth every day.        Misc. Devices (Misc) Breast Pump  Breast Pump        Norethindrone (Tab) MICRONOR 0.35 MG Take 1 Tab by mouth every day. TAKE 1 TAB BY MOUTH EVERY DAY.        Oxycodone-Acetaminophen (Tab) PERCOCET 5-325 MG Take 1 Tab by mouth every four hours as needed for Moderate Pain ((Pain Scale 4-6); If acetaminophen ineffective).        .                 Medicines prescribed today were sent to:     [facility]/PHARMACY #[id number] - [place], [place] - [address]    [address] [address]    Phone: [phone] Fax: [phone]    Open 24 Hours?: No      Medication refill  instructions:       If your prescription bottle indicates you have medication refills left, it is not necessary to call your provider’s office. Please contact your pharmacy and they will refill your medication.    If your prescription bottle indicates you do not have any refills left, you may request refills at any time through one of the following ways: The online [employer] system (except Urgent Care), by calling your provider’s office, or by asking your pharmacy to contact your provider’s office with a refill request. Medication refills are processed only during regular business hours and may not be available until the next business day. Your provider may request additional information or to have a follow-up visit with you prior to refilling your medication.   *Please Note: Medication refills are assigned a new Rx number when refilled electronically. Your pharmacy may indicate that no refills were authorized even though a new prescription for the same medication is available at the pharmacy. Please request the medicine by name with the pharmacy before contacting your provider for a refill.           [employer] Access Code: Activation code not generated  Current [employer] Status: Active

## 2017-08-15 NOTE — PROGRESS NOTES
Pt here today for ob follow up, good fetal movement, denies LOF,VB.  Pt states no issues or concerns today  Kick count sheet given and explained

## 2017-08-31 ENCOUNTER — ROUTINE PRENATAL (OUTPATIENT)
Dept: OBGYN | Facility: CLINIC | Age: 27
End: 2017-08-31
Payer: COMMERCIAL

## 2017-08-31 VITALS — DIASTOLIC BLOOD PRESSURE: 60 MMHG | BODY MASS INDEX: 31.09 KG/M2 | WEIGHT: 170 LBS | SYSTOLIC BLOOD PRESSURE: 124 MMHG

## 2017-08-31 DIAGNOSIS — O44.02 PLACENTA PREVIA ANTEPARTUM IN SECOND TRIMESTER: ICD-10-CM

## 2017-08-31 DIAGNOSIS — Z34.82 ENCOUNTER FOR SUPERVISION OF OTHER NORMAL PREGNANCY IN SECOND TRIMESTER: ICD-10-CM

## 2017-08-31 PROCEDURE — 90040 PR PRENATAL FOLLOW UP: CPT | Performed by: OBSTETRICS & GYNECOLOGY

## 2017-08-31 NOTE — PROGRESS NOTES
Patient is at 32w1d .no complaints  Fetal Movement : positive       Patients' weight gain, fluid intake and exercise level discussed.Vitals, fundal height , fetal position, and FHR reviewed on flowsheet.    .../60   Wt 77.1 kg (170 lb)   LMP 01/18/2017 (Exact Date)   BMI 31.09 kg/m²   Past Medical History:   Diagnosis Date   • ASTHMA    • Migraine    • Shingles     History of      Patient Active Problem List    Diagnosis Date Noted   • Placenta previa antepartum in second trimester 07/18/2017   • Encounter for supervision of normal pregnancy in second trimester 06/20/2017   • Asthma, mild persistent 10/23/2014   • Shingles          Lab:No results found for this or any previous visit (from the past 336 hour(s)).    Assessment:  1  32w1d  2. . Doing well  3. Size equals Dates and/or Scan  4. Weight gain: normal: No, excessive:Yes                        Plan.  1. Rediscuss diet.  2. Increase water intake PRN  3. Continue vitamins.  4. Kick counts as instructed.  5. Discuss support hose and proper shoe wear as indicated.

## 2017-09-15 ENCOUNTER — ROUTINE PRENATAL (OUTPATIENT)
Dept: OBGYN | Facility: MEDICAL CENTER | Age: 27
End: 2017-09-15
Payer: COMMERCIAL

## 2017-09-15 VITALS — BODY MASS INDEX: 31.28 KG/M2 | SYSTOLIC BLOOD PRESSURE: 118 MMHG | DIASTOLIC BLOOD PRESSURE: 70 MMHG | WEIGHT: 171 LBS

## 2017-09-15 DIAGNOSIS — Z34.82 ENCOUNTER FOR SUPERVISION OF OTHER NORMAL PREGNANCY IN SECOND TRIMESTER: ICD-10-CM

## 2017-09-15 DIAGNOSIS — O44.02 PLACENTA PREVIA ANTEPARTUM IN SECOND TRIMESTER: ICD-10-CM

## 2017-09-15 PROCEDURE — 90040 PR PRENATAL FOLLOW UP: CPT | Performed by: OBSTETRICS & GYNECOLOGY

## 2017-09-15 PROCEDURE — 90471 IMMUNIZATION ADMIN: CPT | Performed by: OBSTETRICS & GYNECOLOGY

## 2017-09-15 PROCEDURE — 90686 IIV4 VACC NO PRSV 0.5 ML IM: CPT | Performed by: OBSTETRICS & GYNECOLOGY

## 2017-09-15 NOTE — PROGRESS NOTES
Patient is at 34w2d. Doing well. Good FM, no contractions, no LOF, no VB  Patients' weight gain, fluid intake and exercise level discussed.Vitals, fundal height , fetal position, and FHR reviewed on flowsheet.    .../70   Wt 77.6 kg (171 lb)   LMP 2017 (Exact Date)   BMI 31.28 kg/m²   Past Medical History:   Diagnosis Date   • ASTHMA    • Migraine    • Shingles     History of      Patient Active Problem List    Diagnosis Date Noted   • Placenta previa antepartum in second trimester 2017   • Encounter for supervision of normal pregnancy in second trimester 2017   • Asthma, mild persistent 10/23/2014   • Shingles      Lab:No results found for this or any previous visit (from the past 336 hour(s)).    Assessment: 27 year old   1  34w2d  2. Doing well  3. Size equals Dates and/or Scan  4. Weight gain: normal: Yes, excessive:No                Plan:  1. Rediscuss diet.  2. Increase water intake   3. Continue vitamins.  4. Fetal kick counts    5. Flu shot today  6. OB ff-up visit 1 week

## 2017-09-25 ENCOUNTER — ROUTINE PRENATAL (OUTPATIENT)
Dept: OBGYN | Facility: CLINIC | Age: 27
End: 2017-09-25
Payer: COMMERCIAL

## 2017-09-25 ENCOUNTER — HOSPITAL ENCOUNTER (OUTPATIENT)
Facility: MEDICAL CENTER | Age: 27
End: 2017-09-25
Attending: OBSTETRICS & GYNECOLOGY
Payer: COMMERCIAL

## 2017-09-25 VITALS — WEIGHT: 171 LBS | SYSTOLIC BLOOD PRESSURE: 114 MMHG | BODY MASS INDEX: 31.28 KG/M2 | DIASTOLIC BLOOD PRESSURE: 70 MMHG

## 2017-09-25 DIAGNOSIS — O44.02 PLACENTA PREVIA ANTEPARTUM IN SECOND TRIMESTER: ICD-10-CM

## 2017-09-25 DIAGNOSIS — Z34.82 ENCOUNTER FOR SUPERVISION OF OTHER NORMAL PREGNANCY IN SECOND TRIMESTER: ICD-10-CM

## 2017-09-25 PROCEDURE — 87653 STREP B DNA AMP PROBE: CPT

## 2017-09-25 PROCEDURE — 90040 PR PRENATAL FOLLOW UP: CPT | Performed by: OBSTETRICS & GYNECOLOGY

## 2017-09-25 NOTE — PROGRESS NOTES
Laury Flores is a 27 y.o.  at 35w5d here today for obstetrical visit.  Patient is without complaints.    She reports good fetal movement.  She denies vaginal bleeding.  She denies rupture of membranes.  She denies contractions.     has Shingles; Asthma, mild persistent; Encounter for supervision of normal pregnancy in second trimester; and Placenta previa antepartum in second trimester on her problem list.        A/P IUP at 35w5d  AFP done  1 hour glucola done  Rhogam o neg  GBS today    F/U in 1 weeks

## 2017-09-26 DIAGNOSIS — Z34.82 ENCOUNTER FOR SUPERVISION OF OTHER NORMAL PREGNANCY IN SECOND TRIMESTER: ICD-10-CM

## 2017-09-26 DIAGNOSIS — O44.02 PLACENTA PREVIA ANTEPARTUM IN SECOND TRIMESTER: ICD-10-CM

## 2017-09-27 LAB — GP B STREP DNA SPEC QL NAA+PROBE: NEGATIVE

## 2017-10-03 ENCOUNTER — ROUTINE PRENATAL (OUTPATIENT)
Dept: OBGYN | Facility: CLINIC | Age: 27
End: 2017-10-03
Payer: COMMERCIAL

## 2017-10-03 VITALS — BODY MASS INDEX: 31.83 KG/M2 | WEIGHT: 174 LBS

## 2017-10-03 DIAGNOSIS — O44.02 PLACENTA PREVIA ANTEPARTUM IN SECOND TRIMESTER: ICD-10-CM

## 2017-10-03 DIAGNOSIS — Z34.82 ENCOUNTER FOR SUPERVISION OF OTHER NORMAL PREGNANCY IN SECOND TRIMESTER: ICD-10-CM

## 2017-10-03 PROCEDURE — 90040 PR PRENATAL FOLLOW UP: CPT | Performed by: OBSTETRICS & GYNECOLOGY

## 2017-10-03 NOTE — PROGRESS NOTES
"Patient is at 36w6d .no complaints  Fetal Movement : positive       Patients' weight gain, fluid intake and exercise level discussed.Vitals, fundal height , fetal position, and FHR reviewed on flowsheet.    ...LMP 01/18/2017 (Exact Date)   Past Medical History:   Diagnosis Date   • ASTHMA    • Migraine    • Shingles     History of      Patient Active Problem List    Diagnosis Date Noted   • Placenta previa antepartum in second trimester 07/18/2017   • Encounter for supervision of normal pregnancy in second trimester 06/20/2017   • Asthma, mild persistent 10/23/2014   • Shingles          Lab:  Recent Results (from the past 336 hour(s))   GRP B STREP, BY PCR (MOLINA BROTH)    Collection Time: 09/25/17  4:14 PM   Result Value Ref Range    Strep Gp B DNA PCR Negative Negative       Assessment:  1  36w6d  2. . Doing well  3. Size equals Dates and/or Scan  4. Weight gain: normal: No, excessive:Yes  5. Still wearing Flip Flops \": suggest better shoes                       Plan.  1. Rediscuss diet.  2. Increase water intake PRN  3. Continue vitamins.  4. Kick counts as instructed.  5. Discuss support hose and proper shoe wear as indicated.    "

## 2017-10-09 ENCOUNTER — ROUTINE PRENATAL (OUTPATIENT)
Dept: OBGYN | Facility: CLINIC | Age: 27
End: 2017-10-09
Payer: COMMERCIAL

## 2017-10-09 DIAGNOSIS — Z34.82 ENCOUNTER FOR SUPERVISION OF OTHER NORMAL PREGNANCY IN SECOND TRIMESTER: ICD-10-CM

## 2017-10-09 DIAGNOSIS — O44.02 PLACENTA PREVIA ANTEPARTUM IN SECOND TRIMESTER: ICD-10-CM

## 2017-10-09 PROCEDURE — 90040 PR PRENATAL FOLLOW UP: CPT | Performed by: OBSTETRICS & GYNECOLOGY

## 2017-10-09 NOTE — PROGRESS NOTES
Laury Flores is a 27 y.o.  at 37w5d here today for obstetrical visit.  Patient is without complaints.    She reports good fetal movement.  She denies vaginal bleeding.  She denies rupture of membranes.  She denies contractions.     has Shingles; Asthma, mild persistent; Encounter for supervision of normal pregnancy in second trimester; and Placenta previa antepartum in second trimester on her problem list.        A/P IUP at 37w5d  AFP done  1 hour glucola done  Rhogam done  GBS neg    F/U in 1 weeks

## 2017-10-16 ENCOUNTER — ROUTINE PRENATAL (OUTPATIENT)
Dept: OBGYN | Facility: CLINIC | Age: 27
End: 2017-10-16
Payer: COMMERCIAL

## 2017-10-16 VITALS — WEIGHT: 176 LBS | DIASTOLIC BLOOD PRESSURE: 70 MMHG | SYSTOLIC BLOOD PRESSURE: 125 MMHG | BODY MASS INDEX: 32.19 KG/M2

## 2017-10-16 DIAGNOSIS — Z34.82 ENCOUNTER FOR SUPERVISION OF OTHER NORMAL PREGNANCY IN SECOND TRIMESTER: ICD-10-CM

## 2017-10-16 DIAGNOSIS — O44.02 PLACENTA PREVIA ANTEPARTUM IN SECOND TRIMESTER: ICD-10-CM

## 2017-10-16 PROCEDURE — 90040 PR PRENATAL FOLLOW UP: CPT | Performed by: OBSTETRICS & GYNECOLOGY

## 2017-10-16 NOTE — PROGRESS NOTES
Laury Flores is a 27 y.o.  at 38w5d here today for obstetrical visit.  Patient is without complaints.    She reports good fetal movement.  She denies vaginal bleeding.  She denies rupture of membranes.  She denies contractions.     has Shingles; Asthma, mild persistent; Encounter for supervision of normal pregnancy in second trimester; and Placenta previa antepartum in second trimester on her problem list.    Patient desires elective induction of labor after 39 weeks, patient is concerned about fetal size  Estimated fetal weight 3500 g, previous pregnancy  7 lbs. 8 oz. with no complications  Cervix is favorable 3 cm 50% effaced -1 station  We'll place order for elective induction    A/P IUP at 38w5d  AFP done  1 hour glucola done  Rhogam done  GBS done    F/U in 1 weeks

## 2017-10-18 ENCOUNTER — HOSPITAL ENCOUNTER (INPATIENT)
Facility: MEDICAL CENTER | Age: 27
LOS: 2 days | End: 2017-10-20
Attending: OBSTETRICS & GYNECOLOGY | Admitting: OBSTETRICS & GYNECOLOGY
Payer: COMMERCIAL

## 2017-10-18 LAB
BASOPHILS # BLD AUTO: 0.4 % (ref 0–1.8)
BASOPHILS # BLD: 0.03 K/UL (ref 0–0.12)
EOSINOPHIL # BLD AUTO: 0.09 K/UL (ref 0–0.51)
EOSINOPHIL NFR BLD: 1.1 % (ref 0–6.9)
ERYTHROCYTE [DISTWIDTH] IN BLOOD BY AUTOMATED COUNT: 50.9 FL (ref 35.9–50)
HCT VFR BLD AUTO: 39.6 % (ref 37–47)
HGB BLD-MCNC: 13.3 G/DL (ref 12–16)
HOLDING TUBE BB 8507: NORMAL
IMM GRANULOCYTES # BLD AUTO: 0.12 K/UL (ref 0–0.11)
IMM GRANULOCYTES NFR BLD AUTO: 1.5 % (ref 0–0.9)
LYMPHOCYTES # BLD AUTO: 1.26 K/UL (ref 1–4.8)
LYMPHOCYTES NFR BLD: 15.7 % (ref 22–41)
MCH RBC QN AUTO: 28.7 PG (ref 27–33)
MCHC RBC AUTO-ENTMCNC: 33.6 G/DL (ref 33.6–35)
MCV RBC AUTO: 85.3 FL (ref 81.4–97.8)
MONOCYTES # BLD AUTO: 0.46 K/UL (ref 0–0.85)
MONOCYTES NFR BLD AUTO: 5.7 % (ref 0–13.4)
NEUTROPHILS # BLD AUTO: 6.09 K/UL (ref 2–7.15)
NEUTROPHILS NFR BLD: 75.6 % (ref 44–72)
NRBC # BLD AUTO: 0 K/UL
NRBC BLD AUTO-RTO: 0 /100 WBC
PLATELET # BLD AUTO: 120 K/UL (ref 164–446)
PMV BLD AUTO: 11.3 FL (ref 9–12.9)
RBC # BLD AUTO: 4.64 M/UL (ref 4.2–5.4)
WBC # BLD AUTO: 8.1 K/UL (ref 4.8–10.8)

## 2017-10-18 PROCEDURE — 85025 COMPLETE CBC W/AUTO DIFF WBC: CPT

## 2017-10-18 PROCEDURE — 3E0P3VZ INTRODUCTION OF HORMONE INTO FEMALE REPRODUCTIVE, PERCUTANEOUS APPROACH: ICD-10-PCS | Performed by: OBSTETRICS & GYNECOLOGY

## 2017-10-18 PROCEDURE — 770002 HCHG ROOM/CARE - OB PRIVATE (112)

## 2017-10-18 PROCEDURE — 0UQMXZZ REPAIR VULVA, EXTERNAL APPROACH: ICD-10-PCS | Performed by: OBSTETRICS & GYNECOLOGY

## 2017-10-18 PROCEDURE — 59409 OBSTETRICAL CARE: CPT

## 2017-10-18 PROCEDURE — 0KQM0ZZ REPAIR PERINEUM MUSCLE, OPEN APPROACH: ICD-10-PCS | Performed by: OBSTETRICS & GYNECOLOGY

## 2017-10-18 PROCEDURE — 700105 HCHG RX REV CODE 258

## 2017-10-18 PROCEDURE — 700111 HCHG RX REV CODE 636 W/ 250 OVERRIDE (IP)

## 2017-10-18 PROCEDURE — 303615 HCHG EPIDURAL/SPINAL ANESTHESIA FOR LABOR

## 2017-10-18 PROCEDURE — 3E0234Z INTRODUCTION OF SERUM, TOXOID AND VACCINE INTO MUSCLE, PERCUTANEOUS APPROACH: ICD-10-PCS | Performed by: OBSTETRICS & GYNECOLOGY

## 2017-10-18 PROCEDURE — 304965 HCHG RECOVERY SERVICES

## 2017-10-18 PROCEDURE — 700105 HCHG RX REV CODE 258: Performed by: OBSTETRICS & GYNECOLOGY

## 2017-10-18 PROCEDURE — 10907ZC DRAINAGE OF AMNIOTIC FLUID, THERAPEUTIC FROM PRODUCTS OF CONCEPTION, VIA NATURAL OR ARTIFICIAL OPENING: ICD-10-PCS | Performed by: OBSTETRICS & GYNECOLOGY

## 2017-10-18 PROCEDURE — 4A1HXCZ MONITORING OF PRODUCTS OF CONCEPTION, CARDIAC RATE, EXTERNAL APPROACH: ICD-10-PCS | Performed by: OBSTETRICS & GYNECOLOGY

## 2017-10-18 PROCEDURE — 700111 HCHG RX REV CODE 636 W/ 250 OVERRIDE (IP): Performed by: OBSTETRICS & GYNECOLOGY

## 2017-10-18 RX ORDER — ROPIVACAINE HYDROCHLORIDE 2 MG/ML
INJECTION, SOLUTION EPIDURAL; INFILTRATION; PERINEURAL
Status: COMPLETED
Start: 2017-10-18 | End: 2017-10-18

## 2017-10-18 RX ORDER — SODIUM CHLORIDE, SODIUM LACTATE, POTASSIUM CHLORIDE, CALCIUM CHLORIDE 600; 310; 30; 20 MG/100ML; MG/100ML; MG/100ML; MG/100ML
INJECTION, SOLUTION INTRAVENOUS PRN
Status: DISCONTINUED | OUTPATIENT
Start: 2017-10-18 | End: 2017-10-20 | Stop reason: HOSPADM

## 2017-10-18 RX ORDER — ALUMINA, MAGNESIA, AND SIMETHICONE 2400; 2400; 240 MG/30ML; MG/30ML; MG/30ML
30 SUSPENSION ORAL EVERY 6 HOURS PRN
Status: DISCONTINUED | OUTPATIENT
Start: 2017-10-18 | End: 2017-10-18 | Stop reason: HOSPADM

## 2017-10-18 RX ORDER — ONDANSETRON 4 MG/1
4 TABLET, ORALLY DISINTEGRATING ORAL EVERY 6 HOURS PRN
Status: DISCONTINUED | OUTPATIENT
Start: 2017-10-18 | End: 2017-10-20 | Stop reason: HOSPADM

## 2017-10-18 RX ORDER — DOCUSATE SODIUM 100 MG/1
100 CAPSULE, LIQUID FILLED ORAL 2 TIMES DAILY PRN
Status: DISCONTINUED | OUTPATIENT
Start: 2017-10-18 | End: 2017-10-20 | Stop reason: HOSPADM

## 2017-10-18 RX ORDER — BISACODYL 10 MG
10 SUPPOSITORY, RECTAL RECTAL PRN
Status: DISCONTINUED | OUTPATIENT
Start: 2017-10-18 | End: 2017-10-20 | Stop reason: HOSPADM

## 2017-10-18 RX ORDER — ONDANSETRON 2 MG/ML
4 INJECTION INTRAMUSCULAR; INTRAVENOUS EVERY 6 HOURS PRN
Status: DISCONTINUED | OUTPATIENT
Start: 2017-10-18 | End: 2017-10-20 | Stop reason: HOSPADM

## 2017-10-18 RX ORDER — SODIUM CHLORIDE, SODIUM LACTATE, POTASSIUM CHLORIDE, CALCIUM CHLORIDE 600; 310; 30; 20 MG/100ML; MG/100ML; MG/100ML; MG/100ML
INJECTION, SOLUTION INTRAVENOUS
Status: COMPLETED
Start: 2017-10-18 | End: 2017-10-18

## 2017-10-18 RX ORDER — SODIUM CHLORIDE, SODIUM LACTATE, POTASSIUM CHLORIDE, CALCIUM CHLORIDE 600; 310; 30; 20 MG/100ML; MG/100ML; MG/100ML; MG/100ML
INJECTION, SOLUTION INTRAVENOUS CONTINUOUS
Status: DISPENSED | OUTPATIENT
Start: 2017-10-18 | End: 2017-10-18

## 2017-10-18 RX ADMIN — OXYTOCIN 1 MILLI-UNITS/MIN: 10 INJECTION, SOLUTION INTRAMUSCULAR; INTRAVENOUS at 13:34

## 2017-10-18 RX ADMIN — OXYTOCIN 125 ML/HR: 10 INJECTION, SOLUTION INTRAMUSCULAR; INTRAVENOUS at 21:43

## 2017-10-18 RX ADMIN — OXYTOCIN 2000 ML/HR: 10 INJECTION, SOLUTION INTRAMUSCULAR; INTRAVENOUS at 20:36

## 2017-10-18 RX ADMIN — SODIUM CHLORIDE, POTASSIUM CHLORIDE, SODIUM LACTATE AND CALCIUM CHLORIDE 125 ML: 600; 310; 30; 20 INJECTION, SOLUTION INTRAVENOUS at 12:00

## 2017-10-18 RX ADMIN — ROPIVACAINE HYDROCHLORIDE 100 ML: 2 INJECTION, SOLUTION EPIDURAL; INFILTRATION at 17:52

## 2017-10-18 RX ADMIN — SODIUM CHLORIDE, POTASSIUM CHLORIDE, SODIUM LACTATE AND CALCIUM CHLORIDE: 600; 310; 30; 20 INJECTION, SOLUTION INTRAVENOUS at 17:14

## 2017-10-18 ASSESSMENT — LIFESTYLE VARIABLES
DO YOU DRINK ALCOHOL: NO
ALCOHOL_USE: NO
EVER_SMOKED: NEVER

## 2017-10-18 ASSESSMENT — PATIENT HEALTH QUESTIONNAIRE - PHQ9
SUM OF ALL RESPONSES TO PHQ QUESTIONS 1-9: 0
1. LITTLE INTEREST OR PLEASURE IN DOING THINGS: NOT AT ALL
SUM OF ALL RESPONSES TO PHQ9 QUESTIONS 1 AND 2: 0
2. FEELING DOWN, DEPRESSED, IRRITABLE, OR HOPELESS: NOT AT ALL

## 2017-10-18 ASSESSMENT — COPD QUESTIONNAIRES
DURING THE PAST 4 WEEKS HOW MUCH DID YOU FEEL SHORT OF BREATH: NONE/LITTLE OF THE TIME
DO YOU EVER COUGH UP ANY MUCUS OR PHLEGM?: NO/ONLY WITH OCCASIONAL COLDS OR INFECTIONS
COPD SCREENING SCORE: 0
HAVE YOU SMOKED AT LEAST 100 CIGARETTES IN YOUR ENTIRE LIFE: NO/DON'T KNOW

## 2017-10-18 ASSESSMENT — PAIN SCALES - GENERAL: PAINLEVEL_OUTOF10: 0

## 2017-10-18 NOTE — PROGRESS NOTES
, edc 10/25, 39.0    1115: Patient presents to L&D for IOL. Patient states + FM. Patient denies LOF, VB. Patient states that she is not having contraction pain at this time.   SVE: 3/60/-1.  1130: Dr. Ragsdale notified. Dr. Ragsdale states she will be at bedside to evaluate patient in person before orders are placed.  1300: Dr. Ragsdale at bedside. SVE per MD:/-2. MD states she will place admission orders and to start patient on pitocin.   1540: Dr. Ragsdale at bedside. AROM, clear fluid. SVE per MD: /-2.   1740: Dr. Clements at bedside to place epidural. Explanation of procedure given. Patient understands explanation.   : SVE: /-2.   : Dr. Ragsdale at bedside. SVE per MD:complete/0. MD states to try some test pushing at this time.  : Delivery of a viable female infant. APGARS 8/9. Left shoulder dystocia x 40 seconds. See flowsheet.  : Placenta delivery.   : Report given to MIKE Medrano. Plan of care discussed. Patient resting in bed with call light in reach.

## 2017-10-18 NOTE — PROGRESS NOTES
"S: Pt feeling contractions    O:Blood pressure 129/82, pulse (!) 103, temperature 36.1 °C (97 °F), resp. rate 16, height 1.575 m (5' 2\"), weight 79.8 kg (176 lb), unknown if currently breastfeeding.    [place] - q 2-3 min  EFM - 150s, moderate variability, + accels  Cx - 5 cm/80%/-2    AROM  With clear fluid.    Pit at 7    A/P: 26 yo  @ 39+0/7 wks admitted for elective IOL. Fetal status reassuring.  Continue pitocin.  Anticipate .   "

## 2017-10-18 NOTE — CARE PLAN
Problem: Safety  Goal: Free from accidental injury  Outcome: PROGRESSING AS EXPECTED  Patient is resting in bed with call light in reach. Bed in low position.     Problem: Knowledge Deficit  Goal: Patient/Support person demonstrates understanding regarding the progression of labor, available options and participates in decision-making process  Outcome: PROGRESSING AS EXPECTED  Patient and FOB educated on labor process. Patient and family state that they have no further questions at this time. Progressing as expected.

## 2017-10-18 NOTE — H&P
"History and Physical      Laury Flores is a 27 y.o. year old female  at Unknown who presents for elective IOL.    Subjective:   negative  For CTXS.   negative Feels pain   negative for LOF  negative for vaginal bleeding.   positive for fetal movement    ROS: Pertinent items are noted in HPI.    Past Medical History:   Diagnosis Date   • ASTHMA    • Migraine    • Shingles     History of      No past surgical history on file.  OB History    Para Term  AB Living   2 1 1     1   SAB TAB Ectopic Molar Multiple Live Births             1      # Outcome Date GA Lbr Alex/2nd Weight Sex Delivery Anes PTL Lv   2             1 Term 02/10/15 39w1d  3.31 kg (7 lb 4.8 oz) F Vag-Vacuum   ANANYA        Social History     Social History   • Marital status:      Spouse name: N/A   • Number of children: N/A   • Years of education: N/A     Occupational History   • Not on file.     Social History Main Topics   • Smoking status: Never Smoker   • Smokeless tobacco: Never Used   • Alcohol use Yes      Comment: socially   • Drug use: No   • Sexual activity: Not on file      Comment: Works at [employer]. Nonsmoker     Other Topics Concern   • Not on file     Social History Narrative   • No narrative on file     Allergies: Benadryl allergy; Pcn [penicillins]; and Promethazine    Current Facility-Administered Medications:   •  LACTATED RINGERS IV SOLN, , , ,     Prenatal care with [facility] starting at 18 wks with following problems:  Patient Active Problem List    Diagnosis Date Noted   • Encounter for supervision of normal pregnancy in second trimester 2017   • Asthma, mild persistent 10/23/2014   • Shingles                Objective:      Blood pressure 129/82, pulse (!) 103, height 1.575 m (5' 2\"), weight 79.8 kg (176 lb), unknown if currently breastfeeding.    General:   no acute distress   Skin:   normal   HEENT:  PERRLA   Lungs:   CTA bilateral   Heart:   S1, S2 normal, no murmur, click, " rub or gallop, regular rate and rhythm, brisk carotid upstroke without bruits, peripheral pulses very brisk, chest is clear without rales or wheezing, no pedal edema, no JVD, no hepatosplenomegaly   Abdomen:   gravid, NT   EFW:  3400 grams   Pelvis:  adequate with gynecoid pelvis   FHT:  140s BPM. Moderate variability, + accels   Uterine Size: S=D   Presentations: Cephalic   Cervix:     Dilation: 4 cm    Effacement: 70%    Station:  -2    Consistency: Soft    Position: Middle     Lab Review  Lab:   Blood type: O     Recent Results (from the past 5880 hour(s))   URINE CULTURE(NEW)    Collection Time: 04/10/17 10:45 AM   Result Value Ref Range    Significant Indicator NEG     Source UR     Urine Culture Mixed skin pinky 10-50,000 cfu/mL    HIV ANTIBODIES    Collection Time: 04/10/17 11:08 AM   Result Value Ref Range    HIV Ag/Ab Combo Assay Non Reactive Non Reactive   URINE CULTURE(NEW)    Collection Time: 04/10/17 11:08 AM   Result Value Ref Range    Significant Indicator NEG     Source UR     Urine Culture No growth at 48 hours    OP PRENATAL PANEL-BLOOD BANK    Collection Time: 04/10/17 11:08 AM   Result Value Ref Range    ABO Grouping Only O     Rh Grouping Only NEG     Antibody Screen Scrn NEG    PRENATAL PANEL 3-HIV-CULTURE    Collection Time: 04/10/17 11:08 AM   Result Value Ref Range    WBC 7.0 4.8 - 10.8 K/uL    RBC 4.55 4.20 - 5.40 M/uL    Hemoglobin 13.5 12.0 - 16.0 g/dL    Hematocrit 40.8 37.0 - 47.0 %    MCV 89.7 81.4 - 97.8 fL    MCH 29.7 27.0 - 33.0 pg    MCHC 33.1 (L) 33.6 - 35.0 g/dL    RDW 44.5 35.9 - 50.0 fL    Platelet Count 155 (L) 164 - 446 K/uL    MPV 11.6 9.0 - 12.9 fL    Neutrophils-Polys 69.00 44.00 - 72.00 %    Lymphocytes 21.30 (L) 22.00 - 41.00 %    Monocytes 6.20 0.00 - 13.40 %    Eosinophils 2.40 0.00 - 6.90 %    Basophils 0.40 0.00 - 1.80 %    Immature Granulocytes 0.70 0.00 - 0.90 %    Nucleated RBC 0.00 /100 WBC    Neutrophils (Absolute) 4.79 2.00 - 7.15 K/uL    Lymphs (Absolute) 1.48  1.00 - 4.80 K/uL    Monos (Absolute) 0.43 0.00 - 0.85 K/uL    Eos (Absolute) 0.17 0.00 - 0.51 K/uL    Baso (Absolute) 0.03 0.00 - 0.12 K/uL    Immature Granulocytes (abs) 0.05 0.00 - 0.11 K/uL    NRBC (Absolute) 0.00 K/uL    Rubella IgG Antibody 159.40 IU/mL    Syphilis, Treponemal Qual Non Reactive Non Reactive    Hepatitis B Surface Antigen Negative Negative   Chlamydia/GC PCR Urine or Swab    Collection Time: 05/23/17 11:19 AM   Result Value Ref Range    Source Genital     C. trachomatis by PCR Negative Negative    N. gonorrhoeae by PCR Negative Negative   AFP TETRA    Collection Time: 05/30/17 10:07 AM   Result Value Ref Range    AFP Value -Eia 76 ng/mL    AFP MOM Value 1.41     Hcg Value 27,546 IU/L    Hcg Mom 1.42     Ue3 Value 1.67 ng/mL    Ue3 Mom 0.83     Interpretation Normal     Maternal [age] at DELIA 27[age]5 yr    Gestational Age Based On US     Gestational Age 19.57 weeks    Insulin Dependent Diabetes No     Race White     Multiple Pregnancy One     Jesi Value -Eia 194 pg/mL    Jesi Mom Value 1.13     Maternal Weight 148 lbs    Err Maternal Scrn AFP See Note    CBC WITHOUT DIFFERENTIAL    Collection Time: 07/25/17  9:46 AM   Result Value Ref Range    WBC 9.3 4.8 - 10.8 K/uL    RBC 4.13 (L) 4.20 - 5.40 M/uL    Hemoglobin 11.9 (L) 12.0 - 16.0 g/dL    Hematocrit 37.0 37.0 - 47.0 %    MCV 89.6 81.4 - 97.8 fL    MCH 28.8 27.0 - 33.0 pg    MCHC 32.2 (L) 33.6 - 35.0 g/dL    RDW 39.9 35.9 - 50.0 fL    Platelet Count 138 (L) 164 - 446 K/uL    MPV 10.7 9.0 - 12.9 fL   T.PALLIDUM AB EIA    Collection Time: 07/25/17  9:46 AM   Result Value Ref Range    Syphilis, Treponemal Qual Non Reactive Non Reactive   GLUCOSE 1HR GESTATIONAL    Collection Time: 07/25/17  9:46 AM   Result Value Ref Range    Glucose, Post Dose 145 (H) 70 - 139 mg/dL   GLUCOSE 3 HR GESTATIONAL    Collection Time: 08/08/17  8:06 AM   Result Value Ref Range    Glucose 1 Hour 153 65 - 180 mg/dL    Baseline Glucose 81 65 - 95 mg/dL    Glucose 3 Hour 140 65 -  140 mg/dL    Glucose 2 Hour 156 (H) 65 - 155 mg/dL   GRP B STREP, BY PCR (MOLINA BROTH)    Collection Time: 17  4:14 PM   Result Value Ref Range    Strep Gp B DNA PCR Negative Negative   CBC WITH DIFFERENTIAL    Collection Time: 10/18/17 11:45 AM   Result Value Ref Range    WBC 8.1 4.8 - 10.8 K/uL    RBC 4.64 4.20 - 5.40 M/uL    Hemoglobin 13.3 12.0 - 16.0 g/dL    Hematocrit 39.6 37.0 - 47.0 %    MCV 85.3 81.4 - 97.8 fL    MCH 28.7 27.0 - 33.0 pg    MCHC 33.6 33.6 - 35.0 g/dL    RDW 50.9 (H) 35.9 - 50.0 fL    Platelet Count 120 (L) 164 - 446 K/uL    MPV 11.3 9.0 - 12.9 fL    Neutrophils-Polys 75.60 (H) 44.00 - 72.00 %    Lymphocytes 15.70 (L) 22.00 - 41.00 %    Monocytes 5.70 0.00 - 13.40 %    Eosinophils 1.10 0.00 - 6.90 %    Basophils 0.40 0.00 - 1.80 %    Immature Granulocytes 1.50 (H) 0.00 - 0.90 %    Nucleated RBC 0.00 /100 WBC    Neutrophils (Absolute) 6.09 2.00 - 7.15 K/uL    Lymphs (Absolute) 1.26 1.00 - 4.80 K/uL    Monos (Absolute) 0.46 0.00 - 0.85 K/uL    Eos (Absolute) 0.09 0.00 - 0.51 K/uL    Baso (Absolute) 0.03 0.00 - 0.12 K/uL    Immature Granulocytes (abs) 0.12 (H) 0.00 - 0.11 K/uL    NRBC (Absolute) 0.00 K/uL        Assessment:   Laury Flores at Unknown  Labor status: Not in labor.  Fetal status reassuring. Here for elective IOL .  Obstetrical history significant for   Patient Active Problem List    Diagnosis Date Noted   • Encounter for supervision of normal pregnancy in second trimester 2017   • Asthma, mild persistent 10/23/2014   • Shingles    .      Plan:     Admit to L&D  GBS negative  Pitocin IOL.  AROM, anticipate .

## 2017-10-19 LAB
ACTION RH IMMUNE GLOB 8505RHG: NORMAL
ERYTHROCYTE [DISTWIDTH] IN BLOOD BY AUTOMATED COUNT: 51.7 FL (ref 35.9–50)
HCT VFR BLD AUTO: 37.6 % (ref 37–47)
HGB BLD-MCNC: 12.7 G/DL (ref 12–16)
IMMUNE ROSETTING TEST 8505FMH: NORMAL
MCH RBC QN AUTO: 29.5 PG (ref 27–33)
MCHC RBC AUTO-ENTMCNC: 33.8 G/DL (ref 33.6–35)
MCV RBC AUTO: 87.4 FL (ref 81.4–97.8)
NUMBER OF RH DOSES IND 8505RD: 1
PLATELET # BLD AUTO: 88 K/UL (ref 164–446)
PMV BLD AUTO: 12.3 FL (ref 9–12.9)
RBC # BLD AUTO: 4.3 M/UL (ref 4.2–5.4)
WBC # BLD AUTO: 8.9 K/UL (ref 4.8–10.8)

## 2017-10-19 PROCEDURE — A9270 NON-COVERED ITEM OR SERVICE: HCPCS | Performed by: OBSTETRICS & GYNECOLOGY

## 2017-10-19 PROCEDURE — 700102 HCHG RX REV CODE 250 W/ 637 OVERRIDE(OP): Performed by: OBSTETRICS & GYNECOLOGY

## 2017-10-19 PROCEDURE — 770002 HCHG ROOM/CARE - OB PRIVATE (112)

## 2017-10-19 PROCEDURE — 85461 HEMOGLOBIN FETAL: CPT

## 2017-10-19 PROCEDURE — 85027 COMPLETE CBC AUTOMATED: CPT

## 2017-10-19 PROCEDURE — 36415 COLL VENOUS BLD VENIPUNCTURE: CPT

## 2017-10-19 RX ORDER — IBUPROFEN 800 MG/1
800 TABLET ORAL EVERY 8 HOURS PRN
Qty: 90 TAB | Refills: 0 | Status: SHIPPED | OUTPATIENT
Start: 2017-10-19 | End: 2021-11-21

## 2017-10-19 RX ORDER — PSEUDOEPHEDRINE HCL 30 MG
100 TABLET ORAL 2 TIMES DAILY PRN
Qty: 60 CAP | Refills: 0 | Status: SHIPPED | OUTPATIENT
Start: 2017-10-19 | End: 2021-11-21

## 2017-10-19 RX ORDER — OXYCODONE HYDROCHLORIDE AND ACETAMINOPHEN 5; 325 MG/1; MG/1
1 TABLET ORAL EVERY 4 HOURS PRN
Status: DISCONTINUED | OUTPATIENT
Start: 2017-10-19 | End: 2017-10-20 | Stop reason: HOSPADM

## 2017-10-19 RX ORDER — IBUPROFEN 800 MG/1
800 TABLET ORAL EVERY 8 HOURS PRN
Status: DISCONTINUED | OUTPATIENT
Start: 2017-10-19 | End: 2017-10-20 | Stop reason: HOSPADM

## 2017-10-19 RX ADMIN — IBUPROFEN 800 MG: 800 TABLET, FILM COATED ORAL at 04:01

## 2017-10-19 RX ADMIN — IBUPROFEN 800 MG: 800 TABLET, FILM COATED ORAL at 20:36

## 2017-10-19 RX ADMIN — IBUPROFEN 800 MG: 800 TABLET, FILM COATED ORAL at 12:47

## 2017-10-19 ASSESSMENT — PAIN SCALES - GENERAL
PAINLEVEL_OUTOF10: 1
PAINLEVEL_OUTOF10: 3
PAINLEVEL_OUTOF10: 6
PAINLEVEL_OUTOF10: 0
PAINLEVEL_OUTOF10: 5
PAINLEVEL_OUTOF10: 0

## 2017-10-19 NOTE — PROGRESS NOTES
2320: Pt and FOB arrived from L&D via wheelchair , bedside report received from RN. Armbands verified. Pt assessed, fundus firm, lochia light, VSS. Pt was able to void without difficulty. Oriented to room, call light, emergency light, Skylight. Educated pt on feeding infant Q 2-3 hours, recording I&Os, and safe sleeping.     Pt was able to latch infant independently soon after arrival to unit. Informed pt to call before next feedings to check infant's blood sugar. POC discussed, questions answered, call light within reach.

## 2017-10-19 NOTE — CARE PLAN
Problem: Altered physiologic condition related to immediate post-delivery state and potential for bleeding/hemorrhage  Goal: Patient physiologically stable as evidenced by normal lochia, palpable uterine involution and vital signs within normal limits  Outcome: PROGRESSING AS EXPECTED  Fundus firm, lochia light, vitals stable. Will continue fundal massage q4h for the first 12 hours. Second bag of Pitocin running at 125 mL/hr.     Problem: Alteration in comfort related to episiotomy, vaginal repair and/or after birth pains  Goal: Patient verbalizes acceptable pain level  Outcome: PROGRESSING AS EXPECTED  Pt denies pain at this time, encouraged her to call for PRN pain medications as needed.

## 2017-10-19 NOTE — DISCHARGE SUMMARY
Discharge Summary:      Laury Flores      Admit Date:   10/18/2017  Discharge Date:  10/19/2017     Admitting diagnosis:  Pregnancy  Labor and delivery, indication for care  Labor and delivery, indication for care  Discharge Diagnosis: Status post vaginal, spontaneous.  Pregnancy Complications: none  Tubal Ligation:  no        History:  Past Medical History:   Diagnosis Date   • ASTHMA    • Migraine    • Shingles     History of      OB History    Para Term  AB Living   2 1 1     1   SAB TAB Ectopic Molar Multiple Live Births             1      # Outcome Date GA Lbr Alex/2nd Weight Sex Delivery Anes PTL Lv   2             1 Term 02/10/15 39w1d  3.31 kg (7 lb 4.8 oz) F Vag-Vacuum   ANANYA           Benadryl allergy; Pcn [penicillins]; and Promethazine  Patient Active Problem List    Diagnosis Date Noted   • Labor and delivery, indication for care 10/18/2017   • Encounter for supervision of normal pregnancy in second trimester 2017   • Asthma, mild persistent 10/23/2014   • [facility] Course:   27 y.o. , now para 2, was admitted with the above mentioned diagnosis, underwent elective induction of labor, vaginal, spontaneous. Delivery was complicated by 40 second shoulder dystocia - resolved w/ Sonja and suprapubic pressure.  Patient postpartum course was unremarkable, with progressive advancement in diet , ambulation and toleration of oral analgesia. Patient without complaints today and desires discharge.      Vitals:    10/18/17 2232 10/18/17 2300 10/18/17 2323 10/19/17 0400   BP: 122/69 121/66 111/66 113/69   Pulse: 87 92 94 68   Resp:   17 18   Temp:   37 °C (98.6 °F) 36.9 °C (98.4 °F)   SpO2:   96% 96%   Weight:       Height:           Current Facility-Administered Medications   Medication Dose   • ibuprofen (MOTRIN) tablet 800 mg  800 mg   • oxycodone-acetaminophen (PERCOCET) 5-325 MG per tablet 1 Tab  1 Tab   • ondansetron (ZOFRAN ODT) dispertab 4 mg  4  mg    Or   • ondansetron (ZOFRAN) syringe/vial injection 4 mg  4 mg   • LR infusion     • PRN oxytocin (PITOCIN) (20 Units/1000 mL) PRN for excessive uterine bleeding - See Admin Instr  125-999 mL/hr   • docusate sodium (COLACE) capsule 100 mg  100 mg   • bisacodyl (DULCOLAX) suppository 10 mg  10 mg   • magnesium hydroxide (MILK OF MAGNESIA) suspension 30 mL  30 mL   • oxytocin (PITOCIN) 20 UNITS/1000ML LR (postpartum)   mL/hr   • oxytocin (PITOCIN) 20 UNITS/1000ML LR (postpartum)  2,000 mL/hr       Exam:  Breast Exam: negative  Abdomen: Abdomen soft, non-tender. BS normal. No masses,  No organomegaly  Fundus Non Tender: appropriately tender, below umbilicus and firm  Incision: none  Perineum: not examined day of discharge  Extremity: extremities, peripheral pulses and reflexes normal, feet normal, good pulses, normal color, temperature and sensation     Labs:  Recent Labs      10/18/17   1145  10/19/17   0505   WBC  8.1  8.9   RBC  4.64  4.30   HEMOGLOBIN  13.3  12.7   HEMATOCRIT  39.6  37.6   MCV  85.3  87.4   MCH  28.7  29.5   MCHC  33.6  33.8   RDW  50.9*  51.7*   PLATELETCT  120*  88*   MPV  11.3  12.3        Activity:   Discharge to home  Pelvic Rest x 6 weeks    Assessment:  27 female now  s/p  on 10/18 - delivery complicated by shoulder dystocia easily relieved -   normal postpartum course  Discharge Assessment: Voiding without difficulty, Taking adequate diet and fluids, No heavy bleeding or foul vaginal discharge      Follow up: .[facility] or [facility] in 5 weeks for vaginal and discussion of contraception  To resume daily PNV and iron supplement if needed with hydration.   Patient to RT [facility] or ER if any of the following occur:  Fever over 100.5  Severe abdominal pain  Red streaks or painful masses in the breasts  Foul smelling discharge or lochia  Heavy vaginal bleeding saturating a pad per hour  S/s of PP depression     Discharge Meds:   Current Outpatient Prescriptions   Medication  Sig Dispense Refill   • ibuprofen (MOTRIN) 800 MG Tab Take 1 Tab by mouth every 8 hours as needed (Mild pain). 90 Tab 0   • docusate sodium 100 MG Cap Take 100 mg by mouth 2 times a day as needed for Constipation. 60 Cap 0       Marco Gomez D.O.

## 2017-10-19 NOTE — CONSULTS
Mother states baby BF better on right breast than she does on the left but that overall it is going well, she denies pain and/or need for assistance with BF.    Educated on outpatient assistance available at [facility].    Encouraged to call for assistance as needed.

## 2017-10-19 NOTE — DELIVERY NOTE
DELIVERY NOTE - Normal Spontaneous Vaginal Delivery    Viable female infant delivered over 2nd degree midline laceration with apgars of 8 and 9, weight of 4075 grams with epidural anesthesia. Head delivered, followed by 40 second shoulder dystocia which responded to McRobert's maneuver and supra pubic traction.  Left shoulder was anterior. Infant placed on maternal abdomen.  Delayed cord clamping performed when cord stopped pulsating.  Cord cut by father of baby. Placenta delivered spontaneously, was 3 vessel and intact.  Cervix and perineum inspected.  2nd degree midline laceration repaired with 2-0 Chromic in usual fashion.  Small right periurethral laceration repaired with interrupted suture of 3-0 chromic. Hemostasis obtained.  EBL 300cc.   20 units of Pitocin in  1000ml LR administered IV after delivery.  There were no other complications besides the shoulder dystocia.  Mother and infant in stable condition in room.

## 2017-10-20 VITALS
BODY MASS INDEX: 32.39 KG/M2 | WEIGHT: 176 LBS | HEART RATE: 72 BPM | HEIGHT: 62 IN | DIASTOLIC BLOOD PRESSURE: 75 MMHG | OXYGEN SATURATION: 95 % | SYSTOLIC BLOOD PRESSURE: 109 MMHG | RESPIRATION RATE: 18 BRPM | TEMPERATURE: 97.7 F

## 2017-10-20 PROCEDURE — 700102 HCHG RX REV CODE 250 W/ 637 OVERRIDE(OP): Performed by: OBSTETRICS & GYNECOLOGY

## 2017-10-20 PROCEDURE — A9270 NON-COVERED ITEM OR SERVICE: HCPCS | Performed by: OBSTETRICS & GYNECOLOGY

## 2017-10-20 RX ADMIN — IBUPROFEN 800 MG: 800 TABLET, FILM COATED ORAL at 05:19

## 2017-10-20 ASSESSMENT — LIFESTYLE VARIABLES: EVER_SMOKED: NEVER

## 2017-10-20 ASSESSMENT — PAIN SCALES - GENERAL
PAINLEVEL_OUTOF10: 2
PAINLEVEL_OUTOF10: 0
PAINLEVEL_OUTOF10: 5

## 2017-10-20 NOTE — DISCHARGE INSTRUCTIONS
POSTPARTUM DISCHARGE INSTRUCTIONS FOR MOM    YOB: 1990   Age: 27 y.o.               Admit Date: 10/18/2017     Discharge Date: 10/20/2017  Attending Doctor:  Adrienne Ragsdale M.D.                  Allergies:  Benadryl allergy; Hazelnuts; Pcn [penicillins]; and Promethazine    Discharged to home by car. Discharged via wheelchair, hospital escort: Yes.  Special equipment needed: Not Applicable  Belongings with: Personal  Be sure to schedule a follow-up appointment with your primary care doctor or any specialists as instructed.     Discharge Plan:   Diet Plan: Discussed  Activity Level: Discussed  Confirmed Follow up Appointment: Patient to Call and Schedule Appointment  Confirmed Symptoms Management: Discussed  Influenza Vaccine Indication: Patient Refuses    REASONS TO CALL YOUR OBSTETRICIAN:  1.   Persistent fever or shaking chills (Temperature higher than 100.4)  2.   Heavy bleeding (soaking more than 1 pad per hour); Passing clots  3.   Foul odor from vagina  4.   Mastitis (Breast infection; breast pain, chills, fever, redness)  5.   Urinary pain, burning or frequency  6.   Episiotomy infection  7.   Abdominal incision infection  8.   Severe depression longer than 24 hours    HAND WASHING  · Prior to handling the baby.  · Before breastfeeding or bottle feeding baby.  · After using the bathroom or changing the baby's diaper.    WOUND CARE  Ask your physician for additional care instructions.  In general:    ·  Incision:      · Keep clean and dry.    · Do NOT lift anything heavier than your baby for up to 6 weeks.    · There should not be any opening or pus.      VAGINAL CARE  · Nothing inside vagina for 6 weeks: no sexual intercourse, tampons or douching.  · Bleeding may continue for 2-4 weeks.  Amount may vary.    · Call your physician for heavy bleeding which means soaking more than 1 pad per hour    BIRTH CONTROL  · It is possible to become pregnant at any time after delivery and while  "breastfeeding.  · Plan to discuss a method of birth control with your physician at your follow up visit. visit.    DIET AND ELIMINATION  · Eating more fiber (bran cereal, fruits, and vegetables) and drinking plenty of fluids will help to avoid constipation.  · Urinary frequency after childbirth is normal.    POSTPARTUM BLUES  During the first few days after birth, you may experience a sense of the \"blues\" which may include impatience, irritability or even crying.  These feeling come and go quickly.  However, as many as 1 in 10 women experience emotional symptoms known as postpartum depression.    Postpartum depression:  May start as early as the second or third day after delivery or take several weeks or months to develop.  Symptoms of \"blues\" are present, but are more intense:  Crying spells; loss of appetite; feelings of hopelessness or loss of control; fear of touching the baby; over concern or no concern at all about the baby; little or no concern about your own appearance/caring for yourself; and/or inability to sleep or excessive sleeping.  Contact your physician if you are experiencing any of these symptoms.    Crisis Hotline:  · [place] Crisis Hotline:  2-152-BLMKEDV  Or 1-641.807.5006  · [place] Crisis Hotline:  1-410.460.1729  Or 371-423-6064    PREVENTING SHAKEN BABY:  If you are angry or stressed, PUT THE BABY IN THE CRIB, step away, take some deep breaths, and wait until you are calm to care for the baby.  DO NOT SHAKE THE BABY.  You are not alone, call a supporter for help.    · Crisis Call Center 24/7 crisis line 470-131-2802 or 1-585.403.5355  · You can also text them, text \"ANSWER\" to 827465    QUIT SMOKING/TOBACCO USE:  I understand the use of any tobacco products increases my chance of suffering from future heart disease and could cause other illnesses which may shorten my life. Quitting the use of tobacco products is the single most important thing I can do to improve my health. For further " information on smoking / tobacco cessation call a Toll Free Quit Line at 1-670.248.2279 (*[employer] [place]) or 1-224.108.6435 (American Lung Association) or you can access the web based program at www.lungusa.org.    · [place] Tobacco Users Help Line:  (465) 495-8968       Toll Free: 1-712.908.2299  · Quit Tobacco Program [facility] (400)882-4364    DEPRESSION / SUICIDE RISK:  As you are discharged from this [facility], it is important to learn how to keep safe from harming yourself.    Recognize the warning signs:  · Abrupt changes in personality, positive or negative- including increase in energy   · Giving away possessions  · Change in eating patterns- significant weight changes-  positive or negative  · Change in sleeping patterns- unable to sleep or sleeping all the time   · Unwillingness or inability to communicate  · Depression  · Unusual sadness, discouragement and loneliness  · Talk of wanting to die  · Neglect of personal appearance   · Rebelliousness- reckless behavior  · Withdrawal from people/activities they love  · Confusion- inability to concentrate     If you or a loved one observes any of these behaviors or has concerns about self-harm, here's what you can do:  · Talk about it- your feelings and reasons for harming yourself  · Remove any means that you might use to hurt yourself (examples: pills, rope, extension cords, firearm)  · Get professional help from the community (Mental Health, Substance Abuse, psychological counseling)  · Do not be alone:Call your Safe Contact- someone whom you trust who will be there for you.  · Call your local CRISIS HOTLINE 195-9039 or 649-929-5887  · Call your local Children's Mobile Crisis Response Team [place] (143) 741-4572 or www.Vite  · Call the toll free National Suicide Prevention Hotlines   · National Suicide Prevention Lifeline 170-937-WQDM (6614)  · National Hope Line Network 800-SUICIDE  (108-7054)    DISCHARGE SURVEY:  Thank you for choosing [facility].  We hope we provided you with very good care.  You may be receiving a survey in the mail.  Please fill it out.  Your opinion is valuable to us.    ADDITIONAL EDUCATIONAL MATERIALS GIVEN TO PATIENT:        My signature on this form indicates that:  1.  I have reviewed and understand the above information  2.  My questions regarding this information have been answered to my satisfaction.  3.  I have formulated a plan with my discharge nurse to obtain my prescribed medication for home.

## 2017-10-20 NOTE — PROGRESS NOTES
Observed MOB latching infant on left side, with football hold.      Discussed discharge feeding plan-   1- To breastfeed first.  2- if latch or breastfeeding is suboptimal, can supplement according to guidelines. (Volumes reviewed per infant birthweight)  3. Use breastpump to protect supply.      ROSY has [facility], and is aware to get her insurance issued pump from [employer]. Also discussed her lactation benefits available to her as outpatient.      ROSY has no other questions or concerns regarding breastfeeding. Encouraged to call for assistance if needed with latch.

## 2017-10-20 NOTE — CARE PLAN
Problem: Altered physiologic condition related to immediate post-delivery state and potential for bleeding/hemorrhage  Goal: Patient physiologically stable as evidenced by normal lochia, palpable uterine involution and vital signs within normal limits  Outcome: PROGRESSING AS EXPECTED  VSS. Fundus firm, lochia light.    Problem: Alteration in comfort related to episiotomy, vaginal repair and/or after birth pains  Goal: Patient verbalizes acceptable pain level  Outcome: PROGRESSING AS EXPECTED  Pain controlled with prn medications per mar. Pt will call to request pain medications.

## 2017-10-20 NOTE — CARE PLAN
Problem: Potential for postpartum infection related to presence of episiotomy/vaginal tear and/or uterine contamination  Goal: Patient will be absent from signs and symptoms of infection  Outcome: PROGRESSING AS EXPECTED  Patient has no S/S of infection noted @ this time. Afebrile.    Problem: Alteration in comfort related to episiotomy, vaginal repair and/or after birth pains  Goal: Patient is able to ambulate, care for self and infant  Outcome: PROGRESSING AS EXPECTED  Patient is ambulating well, and able to care for self and infant.

## 2017-10-20 NOTE — DISCHARGE SUMMARY
Patient is a 27 y.o female now  s/p  on 10/18 - was stable and discharged yesterday but patient was boarded an additional 24 hours 2/2 to observation of  for at least >24 hrs.     Patient is stable and improving from her assessment yesterday and ready for discharge.

## 2017-11-29 ENCOUNTER — POST PARTUM (OUTPATIENT)
Dept: OBGYN | Facility: CLINIC | Age: 27
End: 2017-11-29
Payer: COMMERCIAL

## 2017-11-29 VITALS
SYSTOLIC BLOOD PRESSURE: 112 MMHG | BODY MASS INDEX: 26.87 KG/M2 | HEIGHT: 62 IN | WEIGHT: 146 LBS | DIASTOLIC BLOOD PRESSURE: 66 MMHG

## 2017-11-29 DIAGNOSIS — Z12.4 SCREENING FOR CERVICAL CANCER: ICD-10-CM

## 2017-11-29 DIAGNOSIS — Z11.51 SPECIAL SCREENING EXAMINATION FOR HUMAN PAPILLOMAVIRUS (HPV): ICD-10-CM

## 2017-11-29 DIAGNOSIS — Q74.0 SHOULDER DYSPLASIA: ICD-10-CM

## 2017-11-29 PROBLEM — Z34.92 ENCOUNTER FOR SUPERVISION OF NORMAL PREGNANCY IN SECOND TRIMESTER: Status: RESOLVED | Noted: 2017-06-20 | Resolved: 2017-11-29

## 2017-11-29 PROCEDURE — 90050 PR POSTPARTUM VISIT: CPT | Performed by: OBSTETRICS & GYNECOLOGY

## 2017-11-29 RX ORDER — ACETAMINOPHEN AND CODEINE PHOSPHATE 120; 12 MG/5ML; MG/5ML
1 SOLUTION ORAL DAILY
Qty: 28 TAB | Refills: 11 | Status: SHIPPED | OUTPATIENT
Start: 2017-11-29 | End: 2018-10-01

## 2017-11-29 NOTE — PROGRESS NOTES
Laury Flores is a 27 y.o.  s/p vaginal, spontaneous on 10/18/17. Patient is here today for her postpartum exam. Currently patient is without complaints at this time. Patient is breast-feeding. Patient has not had menses yet. The patient desires BCP Micronor, has taken in the past, consider IUD      Well-developed well-nourished female in no apparent distress  Heart regular rate and rhythm  Lungs clear to auscultation bilaterally  Breasts exam and nontender not engorged no dominant masses  Extremities show no clubbing cyanosis or edema    Normal external female genitalia  Well-healed from delivery  Cervix is normal  Uterus approximately 8-10 weeks in size nontender  Adnexa bilaterally normal with no dominant masses    Today I discussed with the patient different types of IUDs  #1 we discussed ParaGard, 10 years Copper IUD with no hormones  Patient should expect regular periods however may get increased cramping or bleeding with her menses.  #2 Mirena and Liletta, both of these contained 52 mg of a progesterone. They're both good for 5 years  They are similar in size to the ParaGard, patient could expect amenorrhea, irregular bleeding and spotting, daily bleeding or spotting  #3Kyleena, also a 5 year IUD with progesterone  Bleeding profile not quite as good as Mirena however the IUD itself is smaller in size and thus may be easier to insert for a patient who is nulliparous  #4 Lucy 3 year IUD with progesterone  Bleeding profile anywhere from amenorrhea to regular menstrual cycles, smaller in size and may be easier to insert    After thorough discussion the patient is considering different  Discussed with patient office procedure for obtaining IUDs, patient will have device ordered through specialty pharmacy once it arrives and we'll schedule placement    Status post vaginal, spontaneous  Doing well without complaints  micronor is prescribed  Followup in  for annual exam

## 2018-02-12 ENCOUNTER — OFFICE VISIT (OUTPATIENT)
Dept: MEDICAL GROUP | Facility: LAB | Age: 28
End: 2018-02-12
Payer: COMMERCIAL

## 2018-02-12 VITALS
TEMPERATURE: 98 F | OXYGEN SATURATION: 99 % | SYSTOLIC BLOOD PRESSURE: 90 MMHG | HEIGHT: 62 IN | BODY MASS INDEX: 25.21 KG/M2 | HEART RATE: 55 BPM | RESPIRATION RATE: 12 BRPM | DIASTOLIC BLOOD PRESSURE: 62 MMHG | WEIGHT: 137 LBS

## 2018-02-12 DIAGNOSIS — R82.90 ABNORMAL URINALYSIS: ICD-10-CM

## 2018-02-12 LAB
APPEARANCE UR: CLEAR
BILIRUB UR STRIP-MCNC: NORMAL MG/DL
COLOR UR AUTO: NORMAL
GLUCOSE UR STRIP.AUTO-MCNC: NORMAL MG/DL
KETONES UR STRIP.AUTO-MCNC: NORMAL MG/DL
LEUKOCYTE ESTERASE UR QL STRIP.AUTO: NORMAL
NITRITE UR QL STRIP.AUTO: NORMAL
PH UR STRIP.AUTO: 7 [PH] (ref 5–8)
PROT UR QL STRIP: NORMAL MG/DL
RBC UR QL AUTO: NORMAL
SP GR UR STRIP.AUTO: 1.01
UROBILINOGEN UR STRIP-MCNC: NORMAL MG/DL

## 2018-02-12 PROCEDURE — 99213 OFFICE O/P EST LOW 20 MIN: CPT | Performed by: NURSE PRACTITIONER

## 2018-02-12 PROCEDURE — 81002 URINALYSIS NONAUTO W/O SCOPE: CPT | Performed by: NURSE PRACTITIONER

## 2018-02-12 ASSESSMENT — PATIENT HEALTH QUESTIONNAIRE - PHQ9: CLINICAL INTERPRETATION OF PHQ2 SCORE: 0

## 2018-02-12 NOTE — PROGRESS NOTES
"Chief Complaint   Patient presents with   • Results     pt had labs completed at work and was told she may have a UTI & possible kidney stones, no current symptoms        HPI   27-year-old established female here to follow-up on an abnormal urinalysis done through her physical at work. She was told that she may have a UTI or kidney stones. The patient did not bring any lab work with her today. She denies any symptoms, specifically denies dysuria, hematuria, flank pain or any trouble urinating. No history of UTI or kidney stone. She is 4 months postpartum with her second child and taking Micronor. She has not had a period since she gave birth in October.    Past medical, surgical, family, and social history is reviewed and updated in Epic chart by me today.   Medications and allergies reviewed and updated in Epic chart by me today.     ROS:   As documented in history of present illness above    Exam:  Blood pressure (!) 90/62, pulse (!) 55, temperature 36.7 °C (98 °F), resp. rate 12, height 1.575 m (5' 2\"), weight 62.1 kg (137 lb), SpO2 99 %, currently breastfeeding.  Gen. appears healthy in no distress   Skin appropriate for sex and age   Lungs no labored breathing   Heart regular   Neuro gait and station normal   Psych appropriate    A/P:  1. Abnormal urinalysis  -negative UA today - specifically no leuks / blood.  Discussed likely contamination on urinalysis through work. We discussed symptoms of UTI and kidney stone. She will follow up on an as-needed basis.  - POCT Urinalysis    "

## 2018-08-27 ENCOUNTER — HOSPITAL ENCOUNTER (OUTPATIENT)
Facility: MEDICAL CENTER | Age: 28
End: 2018-08-27
Attending: NURSE PRACTITIONER
Payer: COMMERCIAL

## 2018-08-27 ENCOUNTER — OFFICE VISIT (OUTPATIENT)
Dept: MEDICAL GROUP | Facility: LAB | Age: 28
End: 2018-08-27
Payer: COMMERCIAL

## 2018-08-27 VITALS
TEMPERATURE: 97.9 F | RESPIRATION RATE: 12 BRPM | HEIGHT: 62 IN | WEIGHT: 126 LBS | DIASTOLIC BLOOD PRESSURE: 68 MMHG | SYSTOLIC BLOOD PRESSURE: 102 MMHG | HEART RATE: 70 BPM | BODY MASS INDEX: 23.19 KG/M2 | OXYGEN SATURATION: 96 %

## 2018-08-27 DIAGNOSIS — Z00.00 ROUTINE GENERAL MEDICAL EXAMINATION AT A HEALTH CARE FACILITY: ICD-10-CM

## 2018-08-27 DIAGNOSIS — Z12.4 SCREENING FOR MALIGNANT NEOPLASM OF CERVIX: ICD-10-CM

## 2018-08-27 DIAGNOSIS — Z01.419 ENCOUNTER FOR GYNECOLOGICAL EXAMINATION: ICD-10-CM

## 2018-08-27 PROCEDURE — 88175 CYTOPATH C/V AUTO FLUID REDO: CPT

## 2018-08-27 PROCEDURE — 99395 PREV VISIT EST AGE 18-39: CPT | Performed by: NURSE PRACTITIONER

## 2018-08-27 NOTE — PROGRESS NOTES
Chief Complaint   Patient presents with   • Gynecologic Exam     Annual pap       HPI:  Laury is a 28 y.o.  female who presents for annual exam. Generally the patient is feeling good. She has no complaints or concerns.  She is almost 1 year postpartum with her second child, she has 2 little girls age 11 months and 3 years old.  She is happily .  She works full-time for the [employer] in [place]  Regarding her health maintenance:   Last pap: 7/2016  Abnormal Pap hx: none  Periods: still breastfeeding - no menses  Contraception: micronor and hates this b/c of irritability.  Does well orthotricyclin.  Wants IUD paraguard but had insurance problems.    Last Mammo:not applicable.  No family history and no breast pain  Last colonoscopy:not applicable.  No family history and no colon problems  Bone density test:N\A   Last Lab: due for follow up   Last Td:current   Influenza vaccination:current   Pneumococcal vaccination:UTD  Zostavax:not applicable   Hx STD''s: no   Regular exercise: yes      meds:   Current Outpatient Prescriptions   Medication Sig Dispense Refill   • norethindrone (MICRONOR) 0.35 MG tablet Take 1 Tab by mouth every day. 28 Tab 11   • ibuprofen (MOTRIN) 800 MG Tab Take 1 Tab by mouth every 8 hours as needed (Mild pain). 90 Tab 0   • docusate sodium 100 MG Cap Take 100 mg by mouth 2 times a day as needed for Constipation. 60 Cap 0   • Prenatal MV-Min-Fe Fum-FA-DHA (PRENATAL 1 PO) Take  by mouth.       No current facility-administered medications for this visit.        Allergies: No Known Allergies    family:   Family History   Problem Relation Age of Onset   • Hypertension Father    • Thyroid Mother    • Diabetes Maternal Grandmother    • Diabetes Paternal Grandmother    • Cancer Paternal Grandmother         Great Grandmother - Breast    • Hypertension Paternal Grandfather    • Heart Disease Paternal Grandfather        social hx:   Social History     Social History   • Marital status:       "Spouse name: N/A   • Number of children: N/A   • Years of education: N/A     Occupational History   • Not on file.     Social History Main Topics   • Smoking status: Never Smoker   • Smokeless tobacco: Never Used   • Alcohol use No      Comment: socially   • Drug use: No   • Sexual activity: Not on file      Comment: Works at [employer]. Nonsmoker     Other Topics Concern   • Not on file     Social History Narrative   • No narrative on file       ROS:  No fever, chills, sweats.   No polydipsia, polyuria, temperature intolerance, significant weight changes   No visual changes, blurred vision.  No chest pain, palpitations, peripheral swelling   No chronic cough, shortness of breath, dyspnea with exertion.   No dysphagia, odynophagia, black or bloody stools.   No abdominal pain, nausea, persistent diarrhea, constipation   No dysuria, hematuria, incontinence. Denies nocturia  No rash, pruritis, pigment changes.   No focal weakness, syncope, headache, confusion, persistent numbness.   All other systems are reviewed and negative.    PHYSICAL EXAMINATION:  Blood pressure 102/68, pulse 70, temperature 36.6 °C (97.9 °F), resp. rate 12, height 1.575 m (5' 2\"), weight 57.2 kg (126 lb), SpO2 96 %, currently breastfeeding.  General appearance:healthy, well developed, well nourished  Psych: alert, no distress, cooperative  Eyes: EOM's normal, pupils equal, round, reactive to light  ENT: Ears: external ears normal to inspection and palpation, TM's clear, Nose/Sinuses: nose shows no deformity, asymmetry, or inflammation  Neck: no asymmetry, masses, or scars, no adenopathy, thyroid normal to palpation  Lungs:chest symmetric with normal A/P diameter, no chest deformities noted, normal respiratory rate and rhythm  Cardiovascular:regular rate and rhythm, S1 normal  Breasts: normal in size and symmetry, skin normal, physiologic fibronodularity  Abdomen: umbilicus normal, no masses palpable, no organomegaly  Musculoskeletal:ROM " of all joints is normal, no evidence of joint instability  Lymphatic: None significantly enlarged  Skin: no rash, no edema  Neuro: mental status intact, cranial nerves 2-12 intact  Pelvic: external genitalia normal, cervix normal in appearance, bimanual exam reveals normal uterus, adnexa without masses or tenderness, vaginal mucosa normal      ASSESSMENT/PLAN:  1.annual physical exam: HCM:  Pap and breast exams done.  BSE technique reviewed and patient encouraged to perform self-exam monthly.   Encourage monthly self breast exam  Encourage daily exercise for at least 30 minutes  Recommend mammogram starting at age 40, colonoscopy age 50  Recommend 1500 mg Calcium with 600 units vit d daily.    Pap every 3 years if today's is normal  Recommend CBC, CMP, LP - fasting if covered by insurance.  She will email me when she has completed breast-feeding and she can be placed back on Ortho Tri-Cyclen.

## 2018-08-28 DIAGNOSIS — Z12.4 SCREENING FOR MALIGNANT NEOPLASM OF CERVIX: ICD-10-CM

## 2018-08-28 LAB — CYTOLOGY REG CYTOL: NORMAL

## 2018-08-31 NOTE — PROGRESS NOTES
Laury Flores is a 27 y.o.  at 21w6d here today for obstetrical visit.  Patient is without complaints.    She reports good fetal movement.  She denies vaginal bleeding.  She denies rupture of membranes.  She denies contractions.     has Shingles; Asthma, mild persistent; and Encounter for supervision of normal pregnancy in second trimester on her problem list.    Reviewed labs with patient  Ultrasound pending    A/P IUP at 21w6d  AFP done  1 hour glucola   Rhogam o neg  GBS     F/U in 4 weeks     Muscle Hinge Flap Text: The defect edges were debeveled with a #15 scalpel blade.  Given the size, depth and location of the defect and the proximity to free margins a muscle hinge flap was deemed most appropriate.  Using a sterile surgical marker, an appropriate hinge flap was drawn incorporating the defect. The area thus outlined was incised with a #15 scalpel blade.  The skin margins were undermined to an appropriate distance in all directions utilizing iris scissors.

## 2018-10-01 RX ORDER — NORGESTIMATE AND ETHINYL ESTRADIOL 7DAYSX3 28
1 KIT ORAL DAILY
Qty: 28 TAB | Refills: 5 | Status: SHIPPED | OUTPATIENT
Start: 2018-10-01 | End: 2019-02-12 | Stop reason: SDUPTHER

## 2019-02-12 RX ORDER — NORGESTIMATE AND ETHINYL ESTRADIOL 7DAYSX3 28
1 KIT ORAL DAILY
Qty: 81 TAB | Refills: 1 | Status: SHIPPED | OUTPATIENT
Start: 2019-02-12 | End: 2019-07-27 | Stop reason: SDUPTHER

## 2019-02-12 NOTE — TELEPHONE ENCOUNTER
Was the patient seen in the last year in this department? Yes LOV-8/27/18    Does patient have an active prescription for medications requested? No     Received Request Via: Pharmacy

## 2019-07-27 NOTE — TELEPHONE ENCOUNTER
Was the patient seen in the last year in this department? Yes lov 8/27/18    Does patient have an active prescription for medications requested? No     Received Request Via: Pharmacy

## 2019-07-28 RX ORDER — NORGESTIMATE AND ETHINYL ESTRADIOL 7DAYSX3 28
KIT ORAL
Qty: 84 TAB | Refills: 1 | Status: SHIPPED | OUTPATIENT
Start: 2019-07-28 | End: 2020-01-08

## 2019-11-18 ENCOUNTER — OFFICE VISIT (OUTPATIENT)
Dept: URGENT CARE | Facility: PHYSICIAN GROUP | Age: 29
End: 2019-11-18
Payer: COMMERCIAL

## 2019-11-18 VITALS
HEIGHT: 61 IN | OXYGEN SATURATION: 100 % | WEIGHT: 135 LBS | BODY MASS INDEX: 25.49 KG/M2 | DIASTOLIC BLOOD PRESSURE: 72 MMHG | TEMPERATURE: 98.1 F | SYSTOLIC BLOOD PRESSURE: 116 MMHG | HEART RATE: 71 BPM

## 2019-11-18 DIAGNOSIS — J02.9 PHARYNGITIS, UNSPECIFIED ETIOLOGY: ICD-10-CM

## 2019-11-18 LAB
INT CON NEG: NORMAL
INT CON POS: NORMAL
S PYO AG THROAT QL: NORMAL

## 2019-11-18 PROCEDURE — 99213 OFFICE O/P EST LOW 20 MIN: CPT | Performed by: FAMILY MEDICINE

## 2019-11-18 PROCEDURE — 87880 STREP A ASSAY W/OPTIC: CPT | Performed by: FAMILY MEDICINE

## 2019-11-18 ASSESSMENT — ENCOUNTER SYMPTOMS
TROUBLE SWALLOWING: 0
FEVER: 0
NAUSEA: 0
VOMITING: 0
EYE PAIN: 0
SORE THROAT: 1
CHILLS: 0
DIZZINESS: 0
MYALGIAS: 0
SHORTNESS OF BREATH: 0

## 2019-11-18 NOTE — LETTER
November 18, 2019         Patient: Laury Flores   YOB: 1990   Date of Visit: 11/18/2019           To Whom it May Concern:    Laury Flores was seen in my clinic on 11/18/2019. Excuse 11/17/2019. She may return to work on 11/20/2019..    If you have any questions or concerns, please don't hesitate to call.        Sincerely,           Cl Stockton M.D.  Electronically Signed

## 2019-11-19 NOTE — PROGRESS NOTES
"  Subjective:   Laury Patel a 29 y.o. female who presents for Pharyngitis (congestrion)    Pharyngitis    This is a new problem. Episode onset: 2 days. Neither side of throat is experiencing more pain than the other. There has been no fever. The pain is mild. Associated symptoms include congestion. Pertinent negatives include no shortness of breath, trouble swallowing or vomiting. She has tried acetaminophen for the symptoms.     Review of Systems   Constitutional: Negative for chills and fever.   HENT: Positive for congestion and sore throat. Negative for trouble swallowing.    Eyes: Negative for pain.   Respiratory: Negative for shortness of breath.    Cardiovascular: Negative for chest pain.   Gastrointestinal: Negative for nausea and vomiting.   Genitourinary: Negative for hematuria.   Musculoskeletal: Negative for myalgias.   Skin: Negative for rash.   Neurological: Negative for dizziness.      Objective:   /72   Pulse 71   Temp 36.7 °C (98.1 °F)   Ht 1.549 m (5' 1\")   Wt 61.2 kg (135 lb)   SpO2 100%   BMI 25.51 kg/m²   Physical Exam  Vitals signs and nursing note reviewed.   Constitutional:       General: She is not in acute distress.     Appearance: She is well-developed.   HENT:      Head: Normocephalic and atraumatic.      Nose: Mucosal edema and rhinorrhea present.      Right Turbinates: Swollen.      Left Turbinates: Swollen.      Mouth/Throat:      Pharynx: Posterior oropharyngeal erythema present.   Eyes:      Conjunctiva/sclera: Conjunctivae normal.      Pupils: Pupils are equal, round, and reactive to light.   Cardiovascular:      Rate and Rhythm: Normal rate and regular rhythm.      Heart sounds: No murmur.   Pulmonary:      Effort: Pulmonary effort is normal. No respiratory distress.      Breath sounds: Normal breath sounds.   Abdominal:      General: There is no distension.      Palpations: Abdomen is soft.      Tenderness: There is no tenderness.   Musculoskeletal: Normal " range of motion.   Skin:     General: Skin is warm and dry.   Neurological:      General: No focal deficit present.      Mental Status: She is alert and oriented to person, place, and time. Mental status is at baseline.      Gait: Gait (gait at baseline) normal.   Psychiatric:         Judgment: Judgment normal.     POC strept: negative  Assessment/Plan:   1. Pharyngitis, unspecified etiology  - POCT Rapid Strep A    Discussed close monitoring, return precautions, and supportive measures including maintaining adequate fluid hydration and caloric intake, relative rest and OTC symptom management including acetaminophen as needed for pain and/or fever.    Differential diagnosis, natural history, supportive care, and indications for immediate follow-up discussed.  Return if symptoms worsen or fail to improve.

## 2020-01-08 RX ORDER — NORGESTIMATE AND ETHINYL ESTRADIOL 7DAYSX3 28
KIT ORAL
Qty: 84 TAB | Refills: 0 | Status: SHIPPED | OUTPATIENT
Start: 2020-01-08 | End: 2020-03-30

## 2020-01-08 NOTE — TELEPHONE ENCOUNTER
Was the patient seen in the last year in this department? No   8/27/18  Does patient have an active prescription for medications requested? No     Received Request Via: Pharmacy

## 2020-03-30 RX ORDER — NORGESTIMATE AND ETHINYL ESTRADIOL 7DAYSX3 28
KIT ORAL
Qty: 84 TAB | Refills: 0 | Status: SHIPPED | OUTPATIENT
Start: 2020-03-30 | End: 2020-06-19

## 2020-03-30 NOTE — TELEPHONE ENCOUNTER
Received request via: Pharmacy    Was the patient seen in the last year in this department? No   8/27/18  Does the patient have an active prescription (recently filled or refills available) for medication(s) requested? No

## 2020-06-19 RX ORDER — NORGESTIMATE AND ETHINYL ESTRADIOL
KIT
Qty: 84 TAB | Refills: 0 | Status: SHIPPED | OUTPATIENT
Start: 2020-06-19 | End: 2021-11-21

## 2020-06-19 NOTE — TELEPHONE ENCOUNTER
Received request via: Pharmacy    Was the patient seen in the last year in this department? No   LOV 08/27/2018  Does the patient have an active prescription (recently filled or refills available) for medication(s) requested? No

## 2020-08-03 ENCOUNTER — GYNECOLOGY VISIT (OUTPATIENT)
Dept: OBGYN | Facility: CLINIC | Age: 30
End: 2020-08-03
Payer: COMMERCIAL

## 2020-08-03 VITALS — BODY MASS INDEX: 22.86 KG/M2 | DIASTOLIC BLOOD PRESSURE: 74 MMHG | SYSTOLIC BLOOD PRESSURE: 122 MMHG | WEIGHT: 121 LBS

## 2020-08-03 DIAGNOSIS — Z30.09 ENCOUNTER FOR COUNSELING REGARDING CONTRACEPTION: ICD-10-CM

## 2020-08-03 PROCEDURE — 99213 OFFICE O/P EST LOW 20 MIN: CPT | Performed by: OBSTETRICS & GYNECOLOGY

## 2020-08-03 NOTE — PROGRESS NOTES
GYN Visit:    Cc: discuss IUD    HPI: 30 y.o.  here for discussion of desired ParaGard IUD.  Patient reports she got off of her oral contraceptives 1 month ago and feels better being off of hormones completely.  Had been on pills for years other than pregnancies prior to that so is not sure what her menstrual cycle is like when not on contraceptives.  Would like to avoid hormones at this time.      ROS:  Gen: denies fevers, general concerns  Abd: denies abdominal pain  Gu: denies vaginal bleeding, discharge, pain    Past Medical History:   Diagnosis Date   • ASTHMA    • Migraine    • Shingles     History of      OB History    Para Term  AB Living   2 2 2     2   SAB TAB Ectopic Molar Multiple Live Births             2      # Outcome Date GA Lbr Alex/2nd Weight Sex Delivery Anes PTL Lv   2 Term 10/18/17 39w0d  4.054 kg (8 lb 15 oz) F Vag-Spont EPI N ANANYA   1 Term 02/10/15 39w1d  3.31 kg (7 lb 4.8 oz) F Vag-Vacuum   ANANYA       GYNHx:   Denies STIs, denies abnormal Paps.  Menses regular    /74   Wt 54.9 kg (121 lb)   LMP 2020   BMI 22.86 kg/m²   Gen; AAO, NAD      A/P: 30 y.o.  here for discussion of desired IUD  Discussed ParaGard IUD versus levonorgestrel IUD with minimal systemic absorption.  Discussed that most women who do not well with systemic hormones still do well with levonorgestrel IUD.  Discussed that ParaGard IUD can increase the cramping or bleeding with her cycle, patient is unsure what her cycles are like when she is not on any hormonal contraception.  Discussed we have Mirena IUD in stock and if she desired this could placed today, otherwise we will need to order ParaGard IUD for her which will take several weeks to get in.  Patient prefers ParaGard IUD, ordered today.  I informed the patient if she has not heard from us in the next 2 to 3 weeks regarding the status of the IUD to contact her office to follow-up and ensure that order has gone through  appropriately.  Desires to use condoms only until IUD inserted.  Discussed importance of avoiding unprotected intercourse for 2 weeks prior to insertion.    F/U: Approximately 1 month for IUD insertion    Cathy Rios MD  [facility], Women's [facility]

## 2020-08-03 NOTE — NON-PROVIDER
Pt here to discuss paragard IUD  Pt states that she would like an IUD with hormones  Good #[phone]  Pharmacy verified.

## 2020-08-27 ENCOUNTER — TELEPHONE (OUTPATIENT)
Dept: OBGYN | Facility: CLINIC | Age: 30
End: 2020-08-27

## 2020-10-13 ENCOUNTER — GYNECOLOGY VISIT (OUTPATIENT)
Dept: OBGYN | Facility: CLINIC | Age: 30
End: 2020-10-13
Payer: COMMERCIAL

## 2020-10-13 VITALS — SYSTOLIC BLOOD PRESSURE: 126 MMHG | WEIGHT: 126 LBS | DIASTOLIC BLOOD PRESSURE: 77 MMHG | BODY MASS INDEX: 23.81 KG/M2

## 2020-10-13 DIAGNOSIS — Z30.430 ENCOUNTER FOR IUD INSERTION: ICD-10-CM

## 2020-10-13 LAB
INT CON NEG: NEGATIVE
INT CON POS: POSITIVE
POC URINE PREGNANCY TEST: NORMAL

## 2020-10-13 PROCEDURE — 58300 INSERT INTRAUTERINE DEVICE: CPT | Performed by: OBSTETRICS & GYNECOLOGY

## 2020-10-13 PROCEDURE — 81025 URINE PREGNANCY TEST: CPT | Performed by: OBSTETRICS & GYNECOLOGY

## 2020-10-13 RX ORDER — COPPER 313.4 MG/1
1 INTRAUTERINE DEVICE INTRAUTERINE ONCE
Status: COMPLETED | OUTPATIENT
Start: 2020-10-13 | End: 2020-10-13

## 2020-10-13 RX ADMIN — COPPER 1 EACH: 313.4 INTRAUTERINE DEVICE INTRAUTERINE at 16:10

## 2020-10-13 NOTE — PROGRESS NOTES
GYN:  Patient here for planned IUD insertion.  See procedure note for detail.    Cathy Rios MD  [facility], Women's Health

## 2020-10-13 NOTE — NON-PROVIDER
Pt here for Paragard insert  Pt states no concerns   Good # [phone]   Pharmacy verified.       Acute asthma exacerbation likely 2/2 viral syndrome   Admit to tele  Continue with Duonebs q6h, Proventil q4h( patient requesting duonebs Q1H)  IV solu-medrol 40mg q8h  Continue with singulair and spiriva  hycodan prn cough  pain control  Dr. Lassiter consulted

## 2020-10-13 NOTE — PROCEDURES
IUD Insertion    Date/Time: 10/13/2020 11:24 AM  Performed by: Cathy Rios M.D.  Authorized by: Cathy Rios M.D.           CC: desires IUD insertion    HPI:  30 y.o.  presents today for desired IUD insertion.  Pt today desires paragard IUD.    LMP last week; no unprotected intercourse.   Current contraception: condoms    Has noticed some increased acne since stopping TAN.      UPT neg    /77   Wt 57.2 kg (126 lb)   BMI 23.81 kg/m²   IUD Insertion:  The bimanual exam is performed the uterus is noted to be 8 weeks in size and is retroverted  A speculum was inserted into the vagina, the cervix was cleansed with Betadine swabs x3  Tenaculum was placed on the anterior lip of the cervix  The IUD was loaded into   The uterus was sounded to a depth of 8cm  The IUD was released into the uterus.    The strings trimmed to approximately 2 cm  Tenaculum was removed from the cervix and hemostasis was noted.  The patient tolerated the procedure well    Lot: [id number]  Exp: 2026    A/P: 30 y.o.  s/p paragard IUD insertion as above  - prior to insertion, risks of IUD reviewed with pt including risk of insertion including pain, bleeding, infection, uterine perforation (approximately 1000) and possible need for additional surgical procedure for removal, as well as risks of IUD including pregnancy/contraception failure,  expulsion.    All questions answered, consent signed.      Plan for f/u as needed for IUD check in 2-4wks, earlier if concerns.      Cathy Rios MD  [facility], Women's [facility]

## 2020-11-23 ENCOUNTER — TELEPHONE (OUTPATIENT)
Dept: OBGYN | Facility: CLINIC | Age: 30
End: 2020-11-23

## 2021-01-29 ENCOUNTER — GYNECOLOGY VISIT (OUTPATIENT)
Dept: OBGYN | Facility: CLINIC | Age: 31
End: 2021-01-29
Payer: COMMERCIAL

## 2021-01-29 VITALS — DIASTOLIC BLOOD PRESSURE: 65 MMHG | WEIGHT: 121 LBS | SYSTOLIC BLOOD PRESSURE: 113 MMHG | BODY MASS INDEX: 22.86 KG/M2

## 2021-01-29 DIAGNOSIS — Z30.431 IUD CHECK UP: ICD-10-CM

## 2021-01-29 PROCEDURE — 99212 OFFICE O/P EST SF 10 MIN: CPT | Performed by: OBSTETRICS & GYNECOLOGY

## 2021-01-29 NOTE — PROGRESS NOTES
Chief complaint: IUD check    History of present illness: 30 y.o. presents with above chief complaint. Pt had ParaGard IUD placed without any complications and presents for an IUD check up. She reports no pain, no discomfort with intercourse, no discharge. Denies fever, chills, nausea, vomiting. Overall very happy with device.    She does report some slightly heavier cycles    Review of systems:  Pertinent positives documented in HPI and all other systems reviewed & are negative      All PMH, PSH, allergies, social history and FH reviewed and updated today:  Past Medical History:   Diagnosis Date   • ASTHMA    • Migraine    • Shingles     History of        History reviewed. No pertinent surgical history.    Allergies:   Allergies   Allergen Reactions   • Benadryl Allergy Hives   • Hazelnuts Swelling   • Pcn [Penicillins] Vomiting   • Promethazine Hives       Social History     Socioeconomic History   • Marital status:      Spouse name: Not on file   • Number of children: Not on file   • Years of education: Not on file   • Highest education level: Not on file   Occupational History   • Not on file   Social Needs   • Financial resource strain: Not on file   • Food insecurity     Worry: Not on file     Inability: Not on file   • Transportation needs     Medical: Not on file     Non-medical: Not on file   Tobacco Use   • Smoking status: Never Smoker   • Smokeless tobacco: Never Used   Substance and Sexual Activity   • Alcohol use: No     Comment: socially   • Drug use: No   • Sexual activity: Not on file     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   Lifestyle   • Physical activity     Days per week: Not on file     Minutes per session: Not on file   • Stress: Not on file   Relationships   • Social connections     Talks on phone: Not on file     Gets together: Not on file     Attends Buddhism service: Not on file     Active member of club or organization: Not on file     Attends meetings of clubs or organizations:  Not on file     Relationship status: Not on file   • Intimate partner violence     Fear of current or ex partner: Not on file     Emotionally abused: Not on file     Physically abused: Not on file     Forced sexual activity: Not on file   Other Topics Concern   • Not on file   Social History Narrative   • Not on file       Family History   Problem Relation Age of Onset   • Hypertension Father    • Thyroid Mother    • Diabetes Maternal Grandmother    • Diabetes Paternal Grandmother    • Cancer Paternal Grandmother         Great Grandmother - Breast    • Hypertension Paternal Grandfather    • Heart Disease Paternal Grandfather        Physical exam:  /65   Wt 54.9 kg (121 lb)     GENERAL APPEARANCE: healthy, alert, no distress, cooperative, smiling  ABDOMEN Abdomen soft, non-tender. BS normal. No masses,  No organomegaly  FEMALE GYN: normal female external genitalia without lesions, no vaginal discharge noted, vulva pink without erythema or friability, urethra is normal without discharge or scarring, no bladder fullness or masses, normal vagina and normal vaginal tone, normal cervix, normal  uterus, size and consistency, IUD strings seen and palpated with normal length of strings, normal anus and perineum.  EXTREMITIES:negative clubbing, cyanosis, edema    NEURO Awake, alert and oriented x 3, Normal gait, no sensory deficits  PSYCHIATRIC: Patient shows appropriate affect, is alert and oriented x3, intact judgment and insight.      1. IUD check up       IUD in appropriate location    Spent  15 minutes in face-to-face patient contact in which greater than 50% of that visit was spent in counseling/coordination of care of IUD and normal menstrual irregularity side effects. Reminded patient to check for strings monthly and to call the office if IUD has fallen out or any other problems should arise. Also reminded patient IUD expires in 10 years.  Follow up yearly for annual exam or sooner as needed.    Discussed with  the patient that her Pap smear is due in August of this year

## 2021-11-21 ENCOUNTER — APPOINTMENT (OUTPATIENT)
Dept: RADIOLOGY | Facility: MEDICAL CENTER | Age: 31
End: 2021-11-21
Attending: EMERGENCY MEDICINE
Payer: COMMERCIAL

## 2021-11-21 ENCOUNTER — HOSPITAL ENCOUNTER (EMERGENCY)
Facility: MEDICAL CENTER | Age: 31
End: 2021-11-21
Attending: EMERGENCY MEDICINE
Payer: COMMERCIAL

## 2021-11-21 VITALS
HEIGHT: 62 IN | WEIGHT: 130 LBS | HEART RATE: 72 BPM | SYSTOLIC BLOOD PRESSURE: 114 MMHG | OXYGEN SATURATION: 98 % | TEMPERATURE: 98.5 F | BODY MASS INDEX: 23.92 KG/M2 | RESPIRATION RATE: 16 BRPM | DIASTOLIC BLOOD PRESSURE: 75 MMHG
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
## 2021-11-21 DIAGNOSIS — S16.1XXA STRAIN OF NECK MUSCLE, INITIAL ENCOUNTER: ICD-10-CM

## 2021-11-21 PROCEDURE — 73030 X-RAY EXAM OF SHOULDER: CPT | Mod: RT

## 2021-11-21 PROCEDURE — 99284 EMERGENCY DEPT VISIT MOD MDM: CPT

## 2021-11-21 PROCEDURE — 72125 CT NECK SPINE W/O DYE: CPT

## 2021-11-21 NOTE — Clinical Note
Laury Flores was seen and treated in our emergency department on 11/21/2021.  She may return to work on 11/23/2021.       If you have any questions or concerns, please don't hesitate to call.      Trevor Kc M.D.
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
## 2021-11-24 ENCOUNTER — TELEPHONE (OUTPATIENT)
Dept: SCHEDULING | Facility: IMAGING CENTER | Age: 31
End: 2021-11-24

## 2021-11-24 ENCOUNTER — OFFICE VISIT (OUTPATIENT)
Dept: MEDICAL GROUP | Facility: LAB | Age: 31
End: 2021-11-24
Payer: COMMERCIAL

## 2021-11-24 VITALS
RESPIRATION RATE: 12 BRPM | DIASTOLIC BLOOD PRESSURE: 68 MMHG | HEIGHT: 62 IN | OXYGEN SATURATION: 100 % | HEART RATE: 64 BPM | WEIGHT: 131 LBS | TEMPERATURE: 97.9 F | BODY MASS INDEX: 24.11 KG/M2 | SYSTOLIC BLOOD PRESSURE: 110 MMHG

## 2021-11-24 DIAGNOSIS — M54.2 NECK PAIN: ICD-10-CM

## 2021-11-24 DIAGNOSIS — M62.838 MUSCLE SPASM: ICD-10-CM

## 2021-11-24 DIAGNOSIS — Z13.220 LIPID SCREENING: ICD-10-CM

## 2021-11-24 DIAGNOSIS — Z80.8 FAMILY HISTORY OF THYROID CANCER: ICD-10-CM

## 2021-11-24 PROBLEM — Q74.0 SHOULDER DYSPLASIA: Status: RESOLVED | Noted: 2017-11-29 | Resolved: 2021-11-24

## 2021-11-24 PROCEDURE — 99203 OFFICE O/P NEW LOW 30 MIN: CPT | Performed by: PHYSICIAN ASSISTANT

## 2021-11-24 RX ORDER — COPPER 313.4 MG/1
1 INTRAUTERINE DEVICE INTRAUTERINE ONCE
COMMUNITY

## 2021-11-24 RX ORDER — CYCLOBENZAPRINE HCL 5 MG
5 TABLET ORAL 3 TIMES DAILY PRN
Qty: 30 TABLET | Refills: 0 | Status: SHIPPED | OUTPATIENT
Start: 2021-11-24 | End: 2021-12-04

## 2021-11-24 RX ORDER — IBUPROFEN 200 MG
200 TABLET ORAL EVERY 6 HOURS PRN
COMMUNITY
End: 2022-11-07

## 2021-11-24 ASSESSMENT — PATIENT HEALTH QUESTIONNAIRE - PHQ9: CLINICAL INTERPRETATION OF PHQ2 SCORE: 0
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
## 2021-11-29 ENCOUNTER — TELEPHONE (OUTPATIENT)
Dept: MEDICAL GROUP | Facility: LAB | Age: 31
End: 2021-11-29

## 2021-11-29 DIAGNOSIS — M54.2 NECK PAIN: ICD-10-CM
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
## 2021-12-13 ENCOUNTER — HOSPITAL ENCOUNTER (OUTPATIENT)
Dept: LAB | Facility: MEDICAL CENTER | Age: 31
End: 2021-12-13
Attending: PHYSICIAN ASSISTANT
Payer: COMMERCIAL

## 2021-12-13 DIAGNOSIS — Z13.220 LIPID SCREENING: ICD-10-CM

## 2021-12-13 DIAGNOSIS — Z80.8 FAMILY HISTORY OF THYROID CANCER: ICD-10-CM

## 2021-12-13 LAB
ALBUMIN SERPL BCP-MCNC: 4.8 G/DL (ref 3.2–4.9)
ALBUMIN/GLOB SERPL: 1.7 G/DL
ALP SERPL-CCNC: 54 U/L (ref 30–99)
ALT SERPL-CCNC: 14 U/L (ref 2–50)
ANION GAP SERPL CALC-SCNC: 11 MMOL/L (ref 7–16)
AST SERPL-CCNC: 14 U/L (ref 12–45)
BASOPHILS # BLD AUTO: 0.6 % (ref 0–1.8)
BASOPHILS # BLD: 0.03 K/UL (ref 0–0.12)
BILIRUB SERPL-MCNC: 0.3 MG/DL (ref 0.1–1.5)
BUN SERPL-MCNC: 11 MG/DL (ref 8–22)
CALCIUM SERPL-MCNC: 9.3 MG/DL (ref 8.5–10.5)
CHLORIDE SERPL-SCNC: 104 MMOL/L (ref 96–112)
CHOLEST SERPL-MCNC: 180 MG/DL (ref 100–199)
CO2 SERPL-SCNC: 24 MMOL/L (ref 20–33)
CREAT SERPL-MCNC: 0.52 MG/DL (ref 0.5–1.4)
EOSINOPHIL # BLD AUTO: 0.13 K/UL (ref 0–0.51)
EOSINOPHIL NFR BLD: 2.7 % (ref 0–6.9)
ERYTHROCYTE [DISTWIDTH] IN BLOOD BY AUTOMATED COUNT: 43 FL (ref 35.9–50)
FASTING STATUS PATIENT QL REPORTED: NORMAL
GLOBULIN SER CALC-MCNC: 2.8 G/DL (ref 1.9–3.5)
GLUCOSE SERPL-MCNC: 87 MG/DL (ref 65–99)
HCT VFR BLD AUTO: 35.1 % (ref 37–47)
HDLC SERPL-MCNC: 93 MG/DL
HGB BLD-MCNC: 11 G/DL (ref 12–16)
IMM GRANULOCYTES # BLD AUTO: 0.02 K/UL (ref 0–0.11)
IMM GRANULOCYTES NFR BLD AUTO: 0.4 % (ref 0–0.9)
LDLC SERPL CALC-MCNC: 80 MG/DL
LYMPHOCYTES # BLD AUTO: 1.47 K/UL (ref 1–4.8)
LYMPHOCYTES NFR BLD: 30.6 % (ref 22–41)
MCH RBC QN AUTO: 24.3 PG (ref 27–33)
MCHC RBC AUTO-ENTMCNC: 31.3 G/DL (ref 33.6–35)
MCV RBC AUTO: 77.5 FL (ref 81.4–97.8)
MONOCYTES # BLD AUTO: 0.32 K/UL (ref 0–0.85)
MONOCYTES NFR BLD AUTO: 6.7 % (ref 0–13.4)
NEUTROPHILS # BLD AUTO: 2.83 K/UL (ref 2–7.15)
NEUTROPHILS NFR BLD: 59 % (ref 44–72)
NRBC # BLD AUTO: 0 K/UL
NRBC BLD-RTO: 0 /100 WBC
PLATELET # BLD AUTO: 254 K/UL (ref 164–446)
PMV BLD AUTO: 12 FL (ref 9–12.9)
POTASSIUM SERPL-SCNC: 4.1 MMOL/L (ref 3.6–5.5)
PROT SERPL-MCNC: 7.6 G/DL (ref 6–8.2)
RBC # BLD AUTO: 4.53 M/UL (ref 4.2–5.4)
SODIUM SERPL-SCNC: 139 MMOL/L (ref 135–145)
TRIGL SERPL-MCNC: 35 MG/DL (ref 0–149)
TSH SERPL DL<=0.005 MIU/L-ACNC: 0.63 UIU/ML (ref 0.38–5.33)
WBC # BLD AUTO: 4.8 K/UL (ref 4.8–10.8)

## 2021-12-13 PROCEDURE — 80061 LIPID PANEL: CPT

## 2021-12-13 PROCEDURE — 36415 COLL VENOUS BLD VENIPUNCTURE: CPT

## 2021-12-13 PROCEDURE — 84443 ASSAY THYROID STIM HORMONE: CPT

## 2021-12-13 PROCEDURE — 85025 COMPLETE CBC W/AUTO DIFF WBC: CPT

## 2021-12-13 PROCEDURE — 80053 COMPREHEN METABOLIC PANEL: CPT

## 2021-12-21 ENCOUNTER — OFFICE VISIT (OUTPATIENT)
Dept: MEDICAL GROUP | Facility: LAB | Age: 31
End: 2021-12-21
Payer: COMMERCIAL

## 2021-12-21 ENCOUNTER — HOSPITAL ENCOUNTER (OUTPATIENT)
Facility: MEDICAL CENTER | Age: 31
End: 2021-12-21
Attending: PHYSICIAN ASSISTANT
Payer: COMMERCIAL

## 2021-12-21 VITALS
WEIGHT: 135 LBS | SYSTOLIC BLOOD PRESSURE: 110 MMHG | DIASTOLIC BLOOD PRESSURE: 68 MMHG | OXYGEN SATURATION: 96 % | HEART RATE: 66 BPM | TEMPERATURE: 97.9 F | HEIGHT: 61 IN | BODY MASS INDEX: 25.49 KG/M2 | RESPIRATION RATE: 12 BRPM

## 2021-12-21 DIAGNOSIS — M54.2 NECK PAIN: ICD-10-CM

## 2021-12-21 DIAGNOSIS — Z12.4 ENCOUNTER FOR PAPANICOLAOU SMEAR OF CERVIX: ICD-10-CM

## 2021-12-21 PROCEDURE — 87624 HPV HI-RISK TYP POOLED RSLT: CPT

## 2021-12-21 PROCEDURE — 99213 OFFICE O/P EST LOW 20 MIN: CPT | Performed by: PHYSICIAN ASSISTANT

## 2021-12-21 PROCEDURE — 87591 N.GONORRHOEAE DNA AMP PROB: CPT

## 2021-12-21 PROCEDURE — 88175 CYTOPATH C/V AUTO FLUID REDO: CPT

## 2021-12-21 PROCEDURE — 87491 CHLMYD TRACH DNA AMP PROBE: CPT

## 2021-12-21 ASSESSMENT — FIBROSIS 4 INDEX: FIB4 SCORE: 0.46

## 2021-12-21 NOTE — PROGRESS NOTES
"Subjective:     CC: Pap smear    HPI:   Ping Mccormick here today for Pap smear    Ob-Gyn/ History:    Last Pap Smear: 2 years ago.  Positive history of abnormal pap smears.  Gyn Surgery: Denies.  Current Contraceptive Method: ParaGard IUD.   Last menstrual period:  11/25/2021.  Periods regular. heavy bleeding. Cramping is moderate.   She do not take OTC analgesics for cramps.  No significant bloating/fluid retention, pelvic pain, or dyspareunia. No vaginal discharge      Neck pain  Patient is been going to PT twice a week and [place] at least once a week.  She has not needed to take the Flexeril recently.  Her neck pain and mobility have improved.  Denies any numbness or tingling going into the arms or hands.      ROS:  Gen: no fevers/chills, no changes in weight  Eyes: no changes in vision  ENT: no sore throat, no hearing loss, no bloody nose  Pulm: no sob, no cough  CV: no chest pain, no palpitations  GI: no nausea/vomiting, no diarrhea  : no dysuria  MSk: no myalgias  Skin: no rash  Neuro: no headaches, no numbness/tingling  Heme/Lymph: no easy bruising    Current Outpatient Medications Ordered in Epic   Medication Sig Dispense Refill   • ibuprofen (MOTRIN) 200 MG Tab Take 200 mg by mouth every 6 hours as needed.     • Paragard Intrauterine Copper IUD 1 Each by Intrauterine route Once.       No current Epic-ordered facility-administered medications on file.         Objective:     Exam:  /68   Pulse 66   Temp 36.6 °C (97.9 °F)   Resp 12   Ht 1.549 m (5' 1\")   Wt 61.2 kg (135 lb)   SpO2 96%   BMI 25.51 kg/m²  Body mass index is 25.51 kg/m².    Constitutional: Alert, no distress, well-groomed.  Skin: Warm, dry, good turgor, no rashes in visible areas.  Eye: Equal, round and reactive, conjunctiva clear, lids normal.  ENMT: Lips without lesions, good dentition, moist mucous membranes.  Neck: Trachea midline, no masses, no thyromegaly.  Respiratory: Unlabored respiratory effort, no cough.  MSK: Normal gait, " moves all extremities.  Neuro: Grossly non-focal.   Psych: Alert and oriented x3, normal affect and mood.  Pelvic exam:  Perineum: No external lesions are noted, color is symmetrical throughout  Vagina: Vaginal vault is well rugated.  Cervix: Parous, nonfriable  Uterus:Normal shape, position and consistency  Bimanual: no adnexal masses or tenderness    Pap was performed and sent to the lab        Assessment & Plan:     31 y.o. female with the following -     1. Encounter for Papanicolaou smear of cervix  Pap smear completed today  Follow-up Pap smear interval pending results    2. Neck pain  Acute problem, improving  Can continue with PT and [place] treatments      I spent a total of 15 minutes with record review, exam, communication with the patient, communication with other providers, and documentation of this encounter.      Return in about 1 year (around 12/21/2022).    Please note that this dictation was created using voice recognition software. I have made every reasonable attempt to correct obvious errors, but there may be errors of grammar and possibly content that I did not discover before finalizing the note.

## 2021-12-23 ENCOUNTER — PATIENT MESSAGE (OUTPATIENT)
Dept: MEDICAL GROUP | Facility: LAB | Age: 31
End: 2021-12-23

## 2021-12-23 LAB
C TRACH DNA GENITAL QL NAA+PROBE: NEGATIVE
CYTOLOGY REG CYTOL: NORMAL
HPV HR 12 DNA CVX QL NAA+PROBE: NEGATIVE
HPV16 DNA SPEC QL NAA+PROBE: NEGATIVE
HPV18 DNA SPEC QL NAA+PROBE: NEGATIVE
N GONORRHOEA DNA GENITAL QL NAA+PROBE: NEGATIVE
SPECIMEN SOURCE: NORMAL
SPECIMEN SOURCE: NORMAL

## 2021-12-23 RX ORDER — FLUCONAZOLE 150 MG/1
150 TABLET ORAL DAILY
Qty: 1 TABLET | Refills: 1 | Status: SHIPPED | OUTPATIENT
Start: 2021-12-23 | End: 2022-11-29

## 2022-11-07 ENCOUNTER — OFFICE VISIT (OUTPATIENT)
Dept: MEDICAL GROUP | Facility: LAB | Age: 32
End: 2022-11-07
Payer: COMMERCIAL

## 2022-11-07 VITALS
TEMPERATURE: 97.3 F | HEIGHT: 62 IN | WEIGHT: 144 LBS | SYSTOLIC BLOOD PRESSURE: 112 MMHG | DIASTOLIC BLOOD PRESSURE: 76 MMHG | OXYGEN SATURATION: 100 % | BODY MASS INDEX: 26.5 KG/M2 | RESPIRATION RATE: 12 BRPM | HEART RATE: 74 BPM

## 2022-11-07 DIAGNOSIS — Z13.21 ENCOUNTER FOR VITAMIN DEFICIENCY SCREENING: ICD-10-CM

## 2022-11-07 DIAGNOSIS — Z23 NEED FOR VACCINATION: ICD-10-CM

## 2022-11-07 DIAGNOSIS — E55.9 VITAMIN D DEFICIENCY: ICD-10-CM

## 2022-11-07 DIAGNOSIS — R79.89 LOW TSH LEVEL: ICD-10-CM

## 2022-11-07 PROCEDURE — 99214 OFFICE O/P EST MOD 30 MIN: CPT | Mod: 25 | Performed by: PHYSICIAN ASSISTANT

## 2022-11-07 PROCEDURE — 90471 IMMUNIZATION ADMIN: CPT | Performed by: PHYSICIAN ASSISTANT

## 2022-11-07 PROCEDURE — 90686 IIV4 VACC NO PRSV 0.5 ML IM: CPT | Performed by: PHYSICIAN ASSISTANT

## 2022-11-07 ASSESSMENT — PATIENT HEALTH QUESTIONNAIRE - PHQ9: CLINICAL INTERPRETATION OF PHQ2 SCORE: 0

## 2022-11-07 ASSESSMENT — FIBROSIS 4 INDEX: FIB4 SCORE: 0.47

## 2022-11-07 NOTE — PROGRESS NOTES
"Subjective:     CC: Labs follow-up    HPI:   Ping Mccormick here today with     Low TSH  Family hx of thyroid cancer and hyperthyroidism in mother and father respectively.  Denies skin changes, hair changes, weight changes, temperature intolerance  Denies neck or throat swelling,      ROS:  Gen: no fevers/chills, no changes in weight  Eyes: no changes in vision  ENT: no sore throat, no hearing loss, no bloody nose  Pulm: no sob, no cough  CV: no chest pain, no palpitations  GI: no nausea/vomiting, no diarrhea  : no dysuria  MSk: no myalgias  Skin: no rash  Neuro: no headaches, no numbness/tingling  Heme/Lymph: no easy bruising    Current Outpatient Medications Ordered in Epic   Medication Sig Dispense Refill    Paragard Intrauterine Copper IUD 1 Each by Intrauterine route Once.      fluconazole (DIFLUCAN) 150 MG tablet Take 1 Tablet by mouth every day. 1 Tablet 1     No current Epic-ordered facility-administered medications on file.       Objective:     Exam:  /76   Pulse 74   Temp 36.3 °C (97.3 °F) (Temporal)   Resp 12   Ht 1.562 m (5' 1.5\")   Wt 65.3 kg (144 lb)   SpO2 100%   BMI 26.77 kg/m²  Body mass index is 26.77 kg/m².    Gen: Alert and oriented, No apparent distress.  Neck: Neck is supple without lymphadenopathy.  Lungs: Normal effort, CTA bilaterally, no wheezes, rhonchi, or rales  CV: Regular rate and rhythm. No murmurs, rubs, or gallops.  Ext: No clubbing, cyanosis, edema.        Assessment & Plan:     32 y.o. female with the following -     1. Low TSH level  New problem  Recheck TSH as well as autoimmune thyroid labs  Follow-up pending lab results and ultrasound results  - TSH WITH REFLEX TO FT4; Future  - THYROID PEROXIDASE  (TPO) AB; Future  - FREE THYROXINE; Future  - TRIIDOTHYRONINE; Future  - US-THYROID; Future    2. Vitamin D deficiency  Advised patient to start 5000 IU vitamin D supplement  Recheck in 6 months  - VITAMIN D,25 HYDROXY (DEFICIENCY); Future    3. Encounter for vitamin " deficiency screening  - IRON/TOTAL IRON BIND; Future  - FERRITIN; Future    4. Need for vaccination  - INFLUENZA VACCINE QUAD INJ (PF)          I spent a total of 16 minutes with record review, exam, communication with the patient, communication with other providers, and documentation of this encounter.      Return in about 2 weeks (around 11/21/2022).    Please note that this dictation was created using voice recognition software. I have made every reasonable attempt to correct obvious errors, but there may be errors of grammar and possibly content that I did not discover before finalizing the note.

## 2022-11-16 ENCOUNTER — HOSPITAL ENCOUNTER (OUTPATIENT)
Dept: LAB | Facility: MEDICAL CENTER | Age: 32
End: 2022-11-16
Attending: PHYSICIAN ASSISTANT
Payer: COMMERCIAL

## 2022-11-16 DIAGNOSIS — E55.9 VITAMIN D DEFICIENCY: ICD-10-CM

## 2022-11-16 DIAGNOSIS — Z13.21 ENCOUNTER FOR VITAMIN DEFICIENCY SCREENING: ICD-10-CM

## 2022-11-16 DIAGNOSIS — R79.89 LOW TSH LEVEL: ICD-10-CM

## 2022-11-16 LAB
IRON SATN MFR SERPL: 8 % (ref 15–55)
IRON SERPL-MCNC: 30 UG/DL (ref 40–170)
TIBC SERPL-MCNC: 370 UG/DL (ref 250–450)
UIBC SERPL-MCNC: 340 UG/DL (ref 110–370)

## 2022-11-16 PROCEDURE — 83540 ASSAY OF IRON: CPT

## 2022-11-16 PROCEDURE — 82306 VITAMIN D 25 HYDROXY: CPT

## 2022-11-16 PROCEDURE — 84480 ASSAY TRIIODOTHYRONINE (T3): CPT

## 2022-11-16 PROCEDURE — 82728 ASSAY OF FERRITIN: CPT

## 2022-11-16 PROCEDURE — 84439 ASSAY OF FREE THYROXINE: CPT

## 2022-11-16 PROCEDURE — 86376 MICROSOMAL ANTIBODY EACH: CPT

## 2022-11-16 PROCEDURE — 36415 COLL VENOUS BLD VENIPUNCTURE: CPT

## 2022-11-16 PROCEDURE — 84443 ASSAY THYROID STIM HORMONE: CPT

## 2022-11-16 PROCEDURE — 83550 IRON BINDING TEST: CPT

## 2022-11-17 LAB
25(OH)D3 SERPL-MCNC: 26 NG/ML (ref 30–100)
FERRITIN SERPL-MCNC: 22.9 NG/ML (ref 10–291)
T3 SERPL-MCNC: 128 NG/DL (ref 60–181)
T4 FREE SERPL-MCNC: 1.23 NG/DL (ref 0.93–1.7)
THYROPEROXIDASE AB SERPL-ACNC: 11 IU/ML (ref 0–9)
TSH SERPL DL<=0.005 MIU/L-ACNC: 0.32 UIU/ML (ref 0.38–5.33)

## 2022-11-21 ENCOUNTER — HOSPITAL ENCOUNTER (OUTPATIENT)
Dept: RADIOLOGY | Facility: MEDICAL CENTER | Age: 32
End: 2022-11-21
Attending: PHYSICIAN ASSISTANT
Payer: COMMERCIAL

## 2022-11-21 DIAGNOSIS — R79.89 LOW TSH LEVEL: ICD-10-CM

## 2022-11-21 PROCEDURE — 76536 US EXAM OF HEAD AND NECK: CPT

## 2022-11-22 DIAGNOSIS — E04.1 THYROID NODULE: ICD-10-CM

## 2022-11-29 ENCOUNTER — OFFICE VISIT (OUTPATIENT)
Dept: MEDICAL GROUP | Facility: LAB | Age: 32
End: 2022-11-29
Payer: COMMERCIAL

## 2022-11-29 VITALS
BODY MASS INDEX: 26.31 KG/M2 | HEIGHT: 62 IN | RESPIRATION RATE: 16 BRPM | DIASTOLIC BLOOD PRESSURE: 70 MMHG | SYSTOLIC BLOOD PRESSURE: 100 MMHG | OXYGEN SATURATION: 97 % | HEART RATE: 84 BPM | TEMPERATURE: 97.6 F | WEIGHT: 143 LBS

## 2022-11-29 DIAGNOSIS — R79.89 LOW TSH LEVEL: ICD-10-CM

## 2022-11-29 PROCEDURE — 99213 OFFICE O/P EST LOW 20 MIN: CPT | Performed by: PHYSICIAN ASSISTANT

## 2022-11-29 RX ORDER — CHOLECALCIFEROL (VITAMIN D3) 1250 MCG
CAPSULE ORAL
COMMUNITY
Start: 2022-11-01

## 2022-11-29 ASSESSMENT — FIBROSIS 4 INDEX: FIB4 SCORE: 0.47

## 2022-11-29 NOTE — PROGRESS NOTES
"Subjective:     CC: Follow-up thyroid    HPI:   Laury here today with     Thyroid  Family hx of thyroid cancer and hyperthyroidism in mother and father respectively.  Denies skin changes, hair changes, weight changes, temperature intolerance  Denies neck or throat swelling, hoarseness or voice changes  Recent thyroid labs did show decreased TSH, thyroid ultrasound did show suspicious right thyroid nodule.  FNA is scheduled for 12/2/2022      ROS:  Gen: no fevers/chills, no changes in weight  Eyes: no changes in vision  ENT: no sore throat, no hearing loss, no bloody nose  Pulm: no sob, no cough  CV: no chest pain, no palpitations  GI: no nausea/vomiting, no diarrhea  : no dysuria  MSk: no myalgias  Skin: no rash  Neuro: no headaches, no numbness/tingling  Heme/Lymph: no easy bruising    Current Outpatient Medications Ordered in Epic   Medication Sig Dispense Refill    Cholecalciferol (VITAMIN D3) 1.25 MG (23217 UT) Cap       Ferrous Sulfate (IRON PO)       fluconazole (DIFLUCAN) 150 MG tablet Take 1 Tablet by mouth every day. 1 Tablet 1    Paragard Intrauterine Copper IUD 1 Each by Intrauterine route Once.       No current Epic-ordered facility-administered medications on file.         Objective:     Exam:  /70   Pulse 84   Temp 36.4 °C (97.6 °F)   Resp 16   Ht 1.562 m (5' 1.5\")   Wt 64.9 kg (143 lb)   SpO2 97%   BMI 26.58 kg/m²  Body mass index is 26.58 kg/m².    Gen: Alert and oriented, No apparent distress.  Neck: Neck is supple without lymphadenopathy.  Lungs: Normal effort, CTA bilaterally, no wheezes, rhonchi, or rales  CV: Regular rate and rhythm. No murmurs, rubs, or gallops.  Ext: No clubbing, cyanosis, edema.        Assessment & Plan:     32 y.o. female with the following -     1. Low TSH level  Reviewed lab results and ultrasound results today with patient and addressed questions.  FNA of right thyroid nodule has previously been ordered and is scheduled for 12/2/2022.  Patient will " follow-up in clinic after biopsy to discuss results      I spent a total of 15 minutes with record review, exam, communication with the patient, communication with other providers, and documentation of this encounter.      Return in about 10 days (around 12/9/2022).    Please note that this dictation was created using voice recognition software. I have made every reasonable attempt to correct obvious errors, but there may be errors of grammar and possibly content that I did not discover before finalizing the note.

## 2022-12-02 ENCOUNTER — HOSPITAL ENCOUNTER (OUTPATIENT)
Dept: RADIOLOGY | Facility: MEDICAL CENTER | Age: 32
End: 2022-12-02
Attending: FAMILY MEDICINE
Payer: COMMERCIAL

## 2022-12-02 DIAGNOSIS — E04.1 THYROID NODULE: ICD-10-CM

## 2022-12-02 PROCEDURE — 88173 CYTOPATH EVAL FNA REPORT: CPT

## 2022-12-02 PROCEDURE — 10005 FNA BX W/US GDN 1ST LES: CPT

## 2022-12-02 NOTE — PROGRESS NOTES
"Patient given [facility] \"Preventing the Spread of Infection\" brochure upon arrival.     US guided right thyroid nodule fine needle aspiration done by Dr. Jules; NON-SEDATION  (no H&P required as this is a NON SEDATION procedure) Right anterior aspect of neck access site; 1 jar of cytolyt obtained and sent to pathology lab; pt tolerated the procedure well; pt hemodynamically stable pre/intra/post procedure; all questions and concerns answered prior to being d/c; patient provided with appropriate education for procedure; pt d/c home.    "

## 2022-12-09 ENCOUNTER — OFFICE VISIT (OUTPATIENT)
Dept: MEDICAL GROUP | Facility: LAB | Age: 32
End: 2022-12-09
Payer: COMMERCIAL

## 2022-12-09 VITALS
DIASTOLIC BLOOD PRESSURE: 74 MMHG | HEART RATE: 80 BPM | OXYGEN SATURATION: 97 % | TEMPERATURE: 97.2 F | BODY MASS INDEX: 25.76 KG/M2 | RESPIRATION RATE: 12 BRPM | SYSTOLIC BLOOD PRESSURE: 108 MMHG | HEIGHT: 62 IN | WEIGHT: 140 LBS

## 2022-12-09 DIAGNOSIS — R79.89 LOW TSH LEVEL: ICD-10-CM

## 2022-12-09 DIAGNOSIS — E61.1 IRON DEFICIENCY: ICD-10-CM

## 2022-12-09 DIAGNOSIS — J02.9 SORE THROAT: ICD-10-CM

## 2022-12-09 DIAGNOSIS — E04.1 THYROID NODULE: ICD-10-CM

## 2022-12-09 LAB
INT CON NEG: NEGATIVE
INT CON POS: POSITIVE
S PYO AG THROAT QL: NEGATIVE

## 2022-12-09 PROCEDURE — 99214 OFFICE O/P EST MOD 30 MIN: CPT | Performed by: PHYSICIAN ASSISTANT

## 2022-12-09 PROCEDURE — 87880 STREP A ASSAY W/OPTIC: CPT | Performed by: PHYSICIAN ASSISTANT

## 2022-12-09 RX ORDER — AZITHROMYCIN 250 MG/1
TABLET, FILM COATED ORAL
Qty: 6 TABLET | Refills: 0 | Status: CANCELLED | OUTPATIENT
Start: 2022-12-09

## 2022-12-09 ASSESSMENT — FIBROSIS 4 INDEX: FIB4 SCORE: 0.47

## 2022-12-09 NOTE — PROGRESS NOTES
"Subjective:     CC: Follow-up thyroid    HPI:   Taramariramon here today with     Thyroid abnormality  Recent FNA showed benign findings    Acne  Patient was evaluated by dermatology and started on doxycycline - mouth swelling with this medication.  Patient has since stopped this medication.  She continues to use a topical clindamycin benzyl peroxide cream    Sore throat  Patient reports sore throat starting yesterday.  She notes that strep throat has been going around at her daughter's school.  Denies sinus pressure, sinus drainage, fever, chills, nausea/vomiting/diarrhea    ROS:  Gen: no fevers/chills, no changes in weight  Eyes: no changes in vision  ENT: no sore throat, no hearing loss, no bloody nose  Pulm: no sob, no cough  CV: no chest pain, no palpitations  GI: no nausea/vomiting, no diarrhea  : no dysuria  MSk: no myalgias  Skin: no rash  Neuro: no headaches, no numbness/tingling  Heme/Lymph: no easy bruising    Current Outpatient Medications Ordered in Epic   Medication Sig Dispense Refill    Cholecalciferol (VITAMIN D3) 1.25 MG (12460 UT) Cap       Ferrous Sulfate (IRON PO)       Paragard Intrauterine Copper IUD 1 Each by Intrauterine route Once.       No current Epic-ordered facility-administered medications on file.         Objective:     Exam:  /74   Pulse 80   Temp 36.2 °C (97.2 °F)   Resp 12   Ht 1.562 m (5' 1.5\")   Wt 63.5 kg (140 lb)   SpO2 97%   BMI 26.02 kg/m²  Body mass index is 26.02 kg/m².    Gen: Alert and oriented, No apparent distress.  ENT EACs clear bilaterally, tympanic membranes within normal limits bilaterally.  Nontender to palpation over frontal and maxillary sinuses.  Erythematous injection without exudate noted in oropharynx  Neck: Neck is supple without lymphadenopathy.  Lungs: Normal effort, CTA bilaterally, no wheezes, rhonchi, or rales  CV: Regular rate and rhythm. No murmurs, rubs, or gallops.  Ext: No clubbing, cyanosis, edema.    POCT strep negative today in " clinic      Assessment & Plan:     32 y.o. female with the following -     1. Sore throat   acute condition  Continue supportive/symptomatic treatment.  Advised patient to contact clinic if symptoms persist or not improve  - POCT Rapid Strep A    2. Low TSH level  Plan to recheck these in 2 to 3 months  Consider endocrinology  - TSH WITH REFLEX TO FT4; Future  - FREE THYROXINE; Future  - TRIIDOTHYRONINE; Future  - THYROID PEROXIDASE  (TPO) AB; Future    3. Thyroid nodule  Biopsy benign  Continue to monitor  - TSH WITH REFLEX TO FT4; Future  - FREE THYROXINE; Future  - TRIIDOTHYRONINE; Future  - THYROID PEROXIDASE  (TPO) AB; Future    4. Iron deficiency  Continue iron supplementation  Recheck in 2 to 3 months  - IRON/TOTAL IRON BIND; Future  - FERRITIN; Future        I spent a total of 20 minutes with record review, exam, communication with the patient, communication with other providers, and documentation of this encounter.      Return in about 3 months (around 3/9/2023).    Please note that this dictation was created using voice recognition software. I have made every reasonable attempt to correct obvious errors, but there may be errors of grammar and possibly content that I did not discover before finalizing the note.

## 2022-12-09 NOTE — PROGRESS NOTES
"Subjective:     CC: Follow-up thyroid    HPI:   Taramariramon here today with     Thyroid abnormality  Recent FNA showed benign findings    Acne  Patient was evaluated by dermatology and started on doxycycline - mouth swelling with this medication.  Patient has since stopped this medication.  She continues to use a topical clindamycin benzyl peroxide cream    Sore throat  Patient reports sore throat starting yesterday.  She notes that strep throat has been going around at her daughter's school.  Denies sinus pressure, sinus drainage, fever, chills, nausea/vomiting/diarrhea    ROS:  Gen: no fevers/chills, no changes in weight  Eyes: no changes in vision  ENT: no sore throat, no hearing loss, no bloody nose  Pulm: no sob, no cough  CV: no chest pain, no palpitations  GI: no nausea/vomiting, no diarrhea  : no dysuria  MSk: no myalgias  Skin: no rash  Neuro: no headaches, no numbness/tingling  Heme/Lymph: no easy bruising    Current Outpatient Medications Ordered in Epic   Medication Sig Dispense Refill    Cholecalciferol (VITAMIN D3) 1.25 MG (12890 UT) Cap       Ferrous Sulfate (IRON PO)       Paragard Intrauterine Copper IUD 1 Each by Intrauterine route Once.       No current Epic-ordered facility-administered medications on file.         Objective:     Exam:  /74   Pulse 80   Temp 36.2 °C (97.2 °F)   Resp 12   Ht 1.562 m (5' 1.5\")   Wt 63.5 kg (140 lb)   SpO2 97%   BMI 26.02 kg/m²  Body mass index is 26.02 kg/m².    Gen: Alert and oriented, No apparent distress.  ENT EACs clear bilaterally, tympanic membranes within normal limits bilaterally.  Nontender to palpation over frontal and maxillary sinuses.  Erythematous injection without exudate noted in oropharynx  Neck: Neck is supple without lymphadenopathy.  Lungs: Normal effort, CTA bilaterally, no wheezes, rhonchi, or rales  CV: Regular rate and rhythm. No murmurs, rubs, or gallops.  Ext: No clubbing, cyanosis, edema.    POCT strep negative today in " clinic      Assessment & Plan:     32 y.o. female with the following -     1. Sore throat   acute condition  Continue supportive/symptomatic treatment.  Advised patient to contact clinic if symptoms persist or not improve  - POCT Rapid Strep A    2. Low TSH level  Plan to recheck these in 2 to 3 months  Consider endocrinology  - TSH WITH REFLEX TO FT4; Future  - FREE THYROXINE; Future  - TRIIDOTHYRONINE; Future  - THYROID PEROXIDASE  (TPO) AB; Future    3. Thyroid nodule  Biopsy benign  Continue to monitor  - TSH WITH REFLEX TO FT4; Future  - FREE THYROXINE; Future  - TRIIDOTHYRONINE; Future  - THYROID PEROXIDASE  (TPO) AB; Future    4. Iron deficiency  Continue iron supplementation  Recheck in 2 to 3 months  - IRON/TOTAL IRON BIND; Future  - FERRITIN; Future        I spent a total of 20 minutes with record review, exam, communication with the patient, communication with other providers, and documentation of this encounter.      Return in about 3 months (around 3/9/2023).    Please note that this dictation was created using voice recognition software. I have made every reasonable attempt to correct obvious errors, but there may be errors of grammar and possibly content that I did not discover before finalizing the note.

## 2022-12-09 NOTE — PROGRESS NOTES
"Subjective:     CC: ***    HPI:   Taramarie here today with ***    No problems updated.      ROS:  Gen: no fevers/chills, no changes in weight  Eyes: no changes in vision  ENT: no sore throat, no hearing loss, no bloody nose  Pulm: no sob, no cough  CV: no chest pain, no palpitations  GI: no nausea/vomiting, no diarrhea  : no dysuria  MSk: no myalgias  Skin: no rash  Neuro: no headaches, no numbness/tingling  Heme/Lymph: no easy bruising    Current Outpatient Medications Ordered in Epic   Medication Sig Dispense Refill    Cholecalciferol (VITAMIN D3) 1.25 MG (31015 UT) Cap       Ferrous Sulfate (IRON PO)       Paragard Intrauterine Copper IUD 1 Each by Intrauterine route Once.       No current Epic-ordered facility-administered medications on file.       Health Maintenance: {COMPLETED:457443}    Objective:     Exam:  /74   Pulse 80   Temp 36.2 °C (97.2 °F)   Resp 12   Ht 1.562 m (5' 1.5\")   Wt 63.5 kg (140 lb)   SpO2 97%   BMI 26.02 kg/m²  Body mass index is 26.02 kg/m².    Gen: Alert and oriented, No apparent distress.  Neck: Neck is supple without lymphadenopathy.  Lungs: Normal effort, CTA bilaterally, no wheezes, rhonchi, or rales  CV: Regular rate and rhythm. No murmurs, rubs, or gallops.  Ext: No clubbing, cyanosis, edema.    A chaperone was offered to the patient during today's exam. {CHAPERONE:40651}    Labs: ***    Assessment & Plan:     32 y.o. female with the following -     Problem List Items Addressed This Visit    None  Visit Diagnoses       Sore throat        Relevant Orders    POCT Rapid Strep A            I spent a total of *** minutes with record review, exam, communication with the patient, communication with other providers, and documentation of this encounter.      No follow-ups on file.    Please note that this dictation was created using voice recognition software. I have made every reasonable attempt to correct obvious errors, but there may be errors of grammar and possibly " content that I did not discover before finalizing the note.

## 2022-12-12 DIAGNOSIS — J02.9 SORE THROAT: ICD-10-CM

## 2022-12-12 RX ORDER — AZITHROMYCIN 250 MG/1
TABLET, FILM COATED ORAL
Qty: 6 TABLET | Refills: 0 | Status: SHIPPED | OUTPATIENT
Start: 2022-12-12 | End: 2023-09-13

## 2022-12-14 LAB — PATHOLOGY CONSULT NOTE: NORMAL

## 2023-02-06 ENCOUNTER — PATIENT MESSAGE (OUTPATIENT)
Dept: MEDICAL GROUP | Facility: LAB | Age: 33
End: 2023-02-06
Payer: COMMERCIAL

## 2023-02-06 DIAGNOSIS — R79.89 ABNORMAL TSH: ICD-10-CM

## 2023-03-30 ENCOUNTER — OFFICE VISIT (OUTPATIENT)
Dept: URGENT CARE | Facility: PHYSICIAN GROUP | Age: 33
End: 2023-03-30
Payer: COMMERCIAL

## 2023-03-30 VITALS
HEART RATE: 102 BPM | OXYGEN SATURATION: 96 % | SYSTOLIC BLOOD PRESSURE: 112 MMHG | HEIGHT: 61 IN | RESPIRATION RATE: 16 BRPM | BODY MASS INDEX: 26.43 KG/M2 | DIASTOLIC BLOOD PRESSURE: 62 MMHG | TEMPERATURE: 98 F | WEIGHT: 140 LBS

## 2023-03-30 DIAGNOSIS — J03.90 EXUDATIVE TONSILLITIS: ICD-10-CM

## 2023-03-30 LAB — S PYO DNA SPEC NAA+PROBE: NOT DETECTED

## 2023-03-30 PROCEDURE — 87651 STREP A DNA AMP PROBE: CPT | Performed by: FAMILY MEDICINE

## 2023-03-30 PROCEDURE — 99213 OFFICE O/P EST LOW 20 MIN: CPT | Performed by: FAMILY MEDICINE

## 2023-03-30 RX ORDER — ISOTRETINOIN 30 MG/1
CAPSULE ORAL
COMMUNITY
Start: 2023-02-01 | End: 2023-09-13

## 2023-03-30 RX ORDER — CHLORAL HYDRATE 500 MG
1000 CAPSULE ORAL
COMMUNITY

## 2023-03-30 RX ORDER — AZITHROMYCIN 250 MG/1
TABLET, FILM COATED ORAL
Qty: 6 TABLET | Refills: 0 | Status: SHIPPED | OUTPATIENT
Start: 2023-03-30 | End: 2023-09-13

## 2023-03-30 ASSESSMENT — FIBROSIS 4 INDEX: FIB4 SCORE: 0.47

## 2023-03-30 NOTE — PROGRESS NOTES
Subjective:     CC:  presents with Pharyngitis            Pharyngitis   This is a new problem. The current episode started in the past 7 days. The problem has been gradually worsening. There has been no fever. The pain is moderate. Associated symptoms include a hoarse voice, swollen glands and trouble swallowing. Pertinent negatives include no abdominal pain, congestion, coughing, diarrhea, headaches, shortness of breath or vomiting. no exposure to strep or mono. He has tried acetaminophen for the symptoms. The treatment provided mild relief.     Social History     Tobacco Use    Smoking status: Never    Smokeless tobacco: Never   Substance Use Topics    Alcohol use: No     Comment: socially    Drug use: No     Current Outpatient Medications on File Prior to Visit   Medication Sig Dispense Refill    Isotretinoin 30 MG Cap       Omega-3 Fatty Acids (FISH OIL) 1000 MG Cap capsule Take 1,000 mg by mouth 3 times a day with meals.      Cholecalciferol (VITAMIN D3) 1.25 MG (46573 UT) Cap       Ferrous Sulfate (IRON PO)       Paragard Intrauterine Copper IUD 1 Each by Intrauterine route Once.      azithromycin (ZITHROMAX) 250 MG Tab 2 tablets  on day 1, followed by 1 tablet once daily on days 2 through 5 6 Tablet 0     No current facility-administered medications on file prior to visit.     Family history was reviewed and not pertinent to current problem.       Review of Systems   Constitutional: Positive for malaise/fatigue. Negative for fever and weight loss.   HENT: Positive for hoarse voice and trouble swallowing. Negative for congestion.    Respiratory: Negative for cough, sputum production and shortness of breath.    Cardiovascular: Negative for chest pain.   Gastrointestinal: Negative for nausea, vomiting, abdominal pain and diarrhea.   Genitourinary: Negative.    Neurological: Negative for dizziness and headaches.   All other systems reviewed and are negative.         Objective:     /62   Pulse (!) 102    "Temp 36.7 °C (98 °F) (Temporal)   Resp 16   Ht 1.549 m (5' 1\")   Wt 63.5 kg (140 lb)   SpO2 96%       Physical Exam   Constitutional:   oriented to person, place, and time.  appears well-developed and well-nourished. No distress.   HENT:   Head: Normocephalic and atraumatic.   Right Ear: External ear normal.   Left Ear: External ear normal.   Nose: Mucosal edema present. Right sinus exhibits no maxillary sinus tenderness and no frontal sinus tenderness. Left sinus exhibits no maxillary sinus tenderness and no frontal sinus tenderness.   Mouth/Throat: Oropharyngeal exudate and posterior oropharyngeal erythema present. No posterior oropharyngeal edema.   Tonsils 3+ bilaterally     Eyes: Conjunctivae and EOM are normal. Pupils are equal, round, and reactive to light. Right eye exhibits no discharge. Left eye exhibits no discharge. No scleral icterus.   Neck: Normal range of motion. Neck supple. No JVD present. No tracheal deviation present. No thyromegaly present.   Cardiovascular: Normal rate, regular rhythm, normal heart sounds and intact distal pulses.  Exam reveals no friction rub.    No murmur heard.  Pulmonary/Chest: Effort normal and breath sounds normal. No respiratory distress.   no wheezes.   no rales.    Musculoskeletal:  exhibits no edema.   Lymphadenopathy:     Positive Cervical adenopathy.   Neurological:   alert and oriented to person, place, and time.   Skin: Skin is warm and dry. No erythema.   Psychiatric:   normal mood and affect.   Nursing note and vitals reviewed.             Assessment/Plan:            1. Exudative tonsillitis   PCN allergic    - POCT CEPHEID GROUP A STREP - PCR   - azithromycin (ZITHROMAX) 250 MG Tab; Take as directed  Dispense: 6 Tablet; Refill: 0   Differential diagnosis, natural history, supportive care, and indications for immediate follow-up discussed. All questions answered. Patient agrees with the plan of care.     Follow-up as needed if symptoms worsen or fail to " improve to PCP, Urgent care or Emergency Room.     I have personally reviewed prior external notes and test results pertinent to today's visit.  I have independently reviewed and interpreted all diagnostics ordered during this urgent care acute visit.

## 2023-09-07 LAB
FERRITIN SERPL-MCNC: 30 NG/ML (ref 15–150)
IRON SATN MFR SERPL: 32 % (ref 15–55)
IRON SERPL-MCNC: 104 UG/DL (ref 27–159)
T3 SERPL-MCNC: 94 NG/DL (ref 71–180)
T4 FREE SERPL-MCNC: 0.9 NG/DL (ref 0.82–1.77)
THYROPEROXIDASE AB SERPL-ACNC: 9 IU/ML (ref 0–34)
TIBC SERPL-MCNC: 325 UG/DL (ref 250–450)
TSH SERPL DL<=0.005 MIU/L-ACNC: 0.68 UIU/ML (ref 0.45–4.5)
UIBC SERPL-MCNC: 221 UG/DL (ref 131–425)

## 2023-09-12 ENCOUNTER — APPOINTMENT (OUTPATIENT)
Dept: MEDICAL GROUP | Facility: LAB | Age: 33
End: 2023-09-12
Payer: COMMERCIAL

## 2023-09-13 ENCOUNTER — HOSPITAL ENCOUNTER (OUTPATIENT)
Dept: LAB | Facility: MEDICAL CENTER | Age: 33
End: 2023-09-13
Attending: PHYSICIAN ASSISTANT
Payer: COMMERCIAL

## 2023-09-13 ENCOUNTER — OFFICE VISIT (OUTPATIENT)
Dept: MEDICAL GROUP | Facility: LAB | Age: 33
End: 2023-09-13
Payer: COMMERCIAL

## 2023-09-13 VITALS
RESPIRATION RATE: 16 BRPM | BODY MASS INDEX: 29.09 KG/M2 | TEMPERATURE: 96.5 F | SYSTOLIC BLOOD PRESSURE: 116 MMHG | HEIGHT: 61 IN | OXYGEN SATURATION: 100 % | DIASTOLIC BLOOD PRESSURE: 68 MMHG | WEIGHT: 154.1 LBS | HEART RATE: 61 BPM

## 2023-09-13 DIAGNOSIS — N92.1 MENORRHAGIA WITH IRREGULAR CYCLE: ICD-10-CM

## 2023-09-13 DIAGNOSIS — Z23 NEED FOR VACCINATION: ICD-10-CM

## 2023-09-13 LAB
BASOPHILS # BLD AUTO: 0.7 % (ref 0–1.8)
BASOPHILS # BLD: 0.04 K/UL (ref 0–0.12)
EOSINOPHIL # BLD AUTO: 0.21 K/UL (ref 0–0.51)
EOSINOPHIL NFR BLD: 3.6 % (ref 0–6.9)
ERYTHROCYTE [DISTWIDTH] IN BLOOD BY AUTOMATED COUNT: 43.3 FL (ref 35.9–50)
HCT VFR BLD AUTO: 43 % (ref 37–47)
HGB BLD-MCNC: 13.5 G/DL (ref 12–16)
IMM GRANULOCYTES # BLD AUTO: 0.01 K/UL (ref 0–0.11)
IMM GRANULOCYTES NFR BLD AUTO: 0.2 % (ref 0–0.9)
LYMPHOCYTES # BLD AUTO: 1.9 K/UL (ref 1–4.8)
LYMPHOCYTES NFR BLD: 32.3 % (ref 22–41)
MCH RBC QN AUTO: 29 PG (ref 27–33)
MCHC RBC AUTO-ENTMCNC: 31.4 G/DL (ref 32.2–35.5)
MCV RBC AUTO: 92.3 FL (ref 81.4–97.8)
MONOCYTES # BLD AUTO: 0.41 K/UL (ref 0–0.85)
MONOCYTES NFR BLD AUTO: 7 % (ref 0–13.4)
NEUTROPHILS # BLD AUTO: 3.32 K/UL (ref 1.82–7.42)
NEUTROPHILS NFR BLD: 56.2 % (ref 44–72)
NRBC # BLD AUTO: 0 K/UL
NRBC BLD-RTO: 0 /100 WBC (ref 0–0.2)
PLATELET # BLD AUTO: 232 K/UL (ref 164–446)
PMV BLD AUTO: 11.5 FL (ref 9–12.9)
RBC # BLD AUTO: 4.66 M/UL (ref 4.2–5.4)
WBC # BLD AUTO: 5.9 K/UL (ref 4.8–10.8)

## 2023-09-13 PROCEDURE — 99213 OFFICE O/P EST LOW 20 MIN: CPT | Mod: 25 | Performed by: PHYSICIAN ASSISTANT

## 2023-09-13 PROCEDURE — 36415 COLL VENOUS BLD VENIPUNCTURE: CPT

## 2023-09-13 PROCEDURE — 85025 COMPLETE CBC W/AUTO DIFF WBC: CPT

## 2023-09-13 PROCEDURE — 3078F DIAST BP <80 MM HG: CPT | Performed by: PHYSICIAN ASSISTANT

## 2023-09-13 PROCEDURE — 84146 ASSAY OF PROLACTIN: CPT

## 2023-09-13 PROCEDURE — 83001 ASSAY OF GONADOTROPIN (FSH): CPT

## 2023-09-13 PROCEDURE — 82626 DEHYDROEPIANDROSTERONE: CPT

## 2023-09-13 PROCEDURE — 3074F SYST BP LT 130 MM HG: CPT | Performed by: PHYSICIAN ASSISTANT

## 2023-09-13 PROCEDURE — 83002 ASSAY OF GONADOTROPIN (LH): CPT

## 2023-09-13 ASSESSMENT — FIBROSIS 4 INDEX: FIB4 SCORE: 0.49

## 2023-09-13 ASSESSMENT — PATIENT HEALTH QUESTIONNAIRE - PHQ9: CLINICAL INTERPRETATION OF PHQ2 SCORE: 0

## 2023-09-13 NOTE — PROGRESS NOTES
"Subjective:     CC: Follow-up labs, heavy menstrual bleeding    HPI:   Laury here today with     Abnormal TSH  -Following with endocrinology - appt at end of the month  -Most recent TSH WNL  -iron WNL - taking supplementation    Heavy/irregular menstrual bleeding  -Pt reports she has had copper IUD since 2021  -pt reports random bleeding - soaking through super pad every 45 min  -She also endorses during her period heavy bleeding, passing clots  -symptoms worsening over the past 2 months   -some cramping in the back but not severe  -denies changes with vaginal discharge  -gained approx 15lbs since last appt 6 months ago     Previously on OCP - was trying to avoid hormones so switched to copper IUD    Pt reports family hx of fibroids with mother - leading to total hysterectomy        ROS:  ROS negative aside as indicated above    Current Outpatient Medications Ordered in Epic   Medication Sig Dispense Refill    Omega-3 Fatty Acids (FISH OIL) 1000 MG Cap capsule Take 1,000 mg by mouth 3 times a day with meals.      Cholecalciferol (VITAMIN D3) 1.25 MG (55023 UT) Cap       Ferrous Sulfate (IRON PO)       Paragard Intrauterine Copper IUD 1 Each by Intrauterine route Once.       No current Epic-ordered facility-administered medications on file.         Objective:     Exam:  /68 (BP Location: Right arm, Patient Position: Sitting, BP Cuff Size: Adult)   Pulse 61   Temp 35.8 °C (96.5 °F) (Temporal)   Resp 16   Ht 1.549 m (5' 1\")   Wt 69.9 kg (154 lb 1.6 oz)   SpO2 100%   BMI 29.12 kg/m²  Body mass index is 29.12 kg/m².    Gen: Alert and oriented, No apparent distress.  Neck: Neck is supple without lymphadenopathy.  Lungs: Normal effort, CTA bilaterally, no wheezes, rhonchi, or rales  CV: Regular rate and rhythm. No murmurs, rubs, or gallops.  Ext: No clubbing, cyanosis, edema.      Assessment & Plan:     33 y.o. female with the following -     1. Menorrhagia with irregular cycle  Discussed with patient that " heavy or irregular menstrual bleeding is a known side effect with copper IUD although given that this started recently in the past 2 months and she has had this IUD for 2+ years does warrant further follow-up via labs and pelvic ultrasound particular given family history of fibroids.  If additional work-up is negative consider removing copper IUD and switching to OCPs, Depo shot or addition of medroxyprogesterone 10 mg daily for 10 to 14 days/month to regulate bleeding.  Patient does note she would like to avoid hormones if possible.  IMAGING: pelvic US  If bleeding unresponsive to initial therapy, consider OBGYN referral for eval and/or endometrial biopsy  - CBC WITH DIFFERENTIAL; Future  - FSH/LH; Future  - PROLACTIN; Future  - DHEA; Future  - US-PELVIC COMPLETE (TRANSABDOMINAL/TRANSVAGINAL) (COMBO); Future    2. Need for vaccination  - Influenza Vaccine Quad Injection (PF)        I spent a total of 16 minutes with record review, exam, communication with the patient, communication with other providers, and documentation of this encounter.      Return for After ultrasound.    Please note that this dictation was created using voice recognition software. I have made every reasonable attempt to correct obvious errors, but there may be errors of grammar and possibly content that I did not discover before finalizing the note.

## 2023-09-14 LAB
FSH SERPL-ACNC: 3.9 MIU/ML
LH SERPL-ACNC: 4.6 IU/L
PROLACTIN SERPL-MCNC: 7.03 NG/ML (ref 2.8–26)

## 2023-09-15 PROCEDURE — 90471 IMMUNIZATION ADMIN: CPT | Performed by: PHYSICIAN ASSISTANT

## 2023-09-15 PROCEDURE — 90686 IIV4 VACC NO PRSV 0.5 ML IM: CPT | Performed by: PHYSICIAN ASSISTANT

## 2023-09-16 LAB — DHEA SERPL-MCNC: 3.11 NG/ML (ref 1.33–7.78)

## 2023-10-09 ENCOUNTER — HOSPITAL ENCOUNTER (OUTPATIENT)
Dept: RADIOLOGY | Facility: MEDICAL CENTER | Age: 33
End: 2023-10-09
Attending: PHYSICIAN ASSISTANT
Payer: COMMERCIAL

## 2023-10-09 DIAGNOSIS — N92.1 MENORRHAGIA WITH IRREGULAR CYCLE: ICD-10-CM

## 2023-10-09 PROCEDURE — 76830 TRANSVAGINAL US NON-OB: CPT

## 2023-10-16 ENCOUNTER — APPOINTMENT (OUTPATIENT)
Dept: MEDICAL GROUP | Facility: LAB | Age: 33
End: 2023-10-16
Payer: COMMERCIAL

## 2023-10-17 ENCOUNTER — APPOINTMENT (OUTPATIENT)
Dept: MEDICAL GROUP | Facility: LAB | Age: 33
End: 2023-10-17
Payer: COMMERCIAL

## 2023-11-01 ENCOUNTER — APPOINTMENT (OUTPATIENT)
Dept: MEDICAL GROUP | Facility: LAB | Age: 33
End: 2023-11-01
Payer: COMMERCIAL

## 2023-11-07 ENCOUNTER — OFFICE VISIT (OUTPATIENT)
Dept: MEDICAL GROUP | Facility: LAB | Age: 33
End: 2023-11-07
Payer: COMMERCIAL

## 2023-11-07 VITALS
OXYGEN SATURATION: 92 % | HEIGHT: 61 IN | HEART RATE: 64 BPM | DIASTOLIC BLOOD PRESSURE: 68 MMHG | WEIGHT: 156.97 LBS | BODY MASS INDEX: 29.64 KG/M2 | TEMPERATURE: 96.9 F | SYSTOLIC BLOOD PRESSURE: 118 MMHG | RESPIRATION RATE: 16 BRPM

## 2023-11-07 DIAGNOSIS — N92.0 MENORRHAGIA WITH REGULAR CYCLE: ICD-10-CM

## 2023-11-07 PROCEDURE — 3078F DIAST BP <80 MM HG: CPT | Performed by: PHYSICIAN ASSISTANT

## 2023-11-07 PROCEDURE — 3074F SYST BP LT 130 MM HG: CPT | Performed by: PHYSICIAN ASSISTANT

## 2023-11-07 PROCEDURE — 99213 OFFICE O/P EST LOW 20 MIN: CPT | Performed by: PHYSICIAN ASSISTANT

## 2023-11-07 ASSESSMENT — FIBROSIS 4 INDEX: FIB4 SCORE: 0.53

## 2023-11-07 NOTE — PROGRESS NOTES
"Subjective:     CC: Follow-up labs, imaging    HPI:   Laury here today with     Heavy periods  No acute findings on lab work.  Pelvic ultrasound largely within normal limits.  Did previously discussed with patient that her heavy periods may be secondary to her copper IUD.  She does wish to avoid additional hormones or treatment at this time.  She like to keep the IUD for now    Thryoid US schedulde for December with endocrine      ROS:  Gen: no fevers/chills, no changes in weight  Eyes: no changes in vision  ENT: no sore throat, no hearing loss, no bloody nose  Pulm: no sob, no cough  CV: no chest pain, no palpitations  GI: no nausea/vomiting, no diarrhea  : no dysuria  MSk: no myalgias  Skin: no rash  Neuro: no headaches, no numbness/tingling  Heme/Lymph: no easy bruising    Current Outpatient Medications Ordered in Epic   Medication Sig Dispense Refill    Omega-3 Fatty Acids (FISH OIL) 1000 MG Cap capsule Take 1,000 mg by mouth 3 times a day with meals.      Cholecalciferol (VITAMIN D3) 1.25 MG (86173 UT) Cap       Ferrous Sulfate (IRON PO)       Paragard Intrauterine Copper IUD 1 Each by Intrauterine route Once.       No current Epic-ordered facility-administered medications on file.       Objective:     Exam:  /68 (BP Location: Right arm, Patient Position: Sitting, BP Cuff Size: Adult)   Pulse 64   Temp 36.1 °C (96.9 °F) (Temporal)   Resp 16   Ht 1.549 m (5' 1\")   Wt 71.2 kg (156 lb 15.5 oz)   SpO2 92%   BMI 29.66 kg/m²  Body mass index is 29.66 kg/m².    Constitutional: Alert, no distress, well-groomed.  Skin: Warm, dry, good turgor, no rashes in visible areas.  Eye: Equal, round and reactive, conjunctiva clear, lids normal.  ENMT: Lips without lesions, good dentition, moist mucous membranes.  Neck: Trachea midline, no masses, no thyromegaly.  Respiratory: Unlabored respiratory effort, no cough.  MSK: Normal gait, moves all extremities.  Neuro: Grossly non-focal.   Psych: Alert and oriented " x3, normal affect and mood.      Assessment & Plan:     33 y.o. female with the following -     1. Menorrhagia with regular cycle  Discussed with patient that heavy or irregular menstrual bleeding is a known side effect with copper IUD.  She would like to keep the IUD for now and avoid hormonal treatments options.  Follow-up as needed        I spent a total of 14 minutes with record review, exam, communication with the patient, communication with other providers, and documentation of this encounter.      No follow-ups on file.    Please note that this dictation was created using voice recognition software. I have made every reasonable attempt to correct obvious errors, but there may be errors of grammar and possibly content that I did not discover before finalizing the note.

## 2024-05-17 ENCOUNTER — APPOINTMENT (OUTPATIENT)
Dept: MEDICAL GROUP | Facility: LAB | Age: 34
End: 2024-05-17
Payer: COMMERCIAL

## 2024-05-20 ENCOUNTER — OFFICE VISIT (OUTPATIENT)
Dept: MEDICAL GROUP | Facility: LAB | Age: 34
End: 2024-05-20
Payer: COMMERCIAL

## 2024-05-20 VITALS
HEART RATE: 77 BPM | SYSTOLIC BLOOD PRESSURE: 110 MMHG | TEMPERATURE: 97.1 F | BODY MASS INDEX: 30.39 KG/M2 | WEIGHT: 160.94 LBS | HEIGHT: 61 IN | OXYGEN SATURATION: 98 % | DIASTOLIC BLOOD PRESSURE: 60 MMHG | RESPIRATION RATE: 18 BRPM

## 2024-05-20 DIAGNOSIS — Z20.9 HISTORY OF EXPOSURE TO INFECTIOUS DISEASE: ICD-10-CM

## 2024-05-20 LAB — S PYO DNA SPEC NAA+PROBE: NOT DETECTED

## 2024-05-20 PROCEDURE — 3078F DIAST BP <80 MM HG: CPT | Performed by: PHYSICIAN ASSISTANT

## 2024-05-20 PROCEDURE — 87651 STREP A DNA AMP PROBE: CPT | Performed by: PHYSICIAN ASSISTANT

## 2024-05-20 PROCEDURE — 99212 OFFICE O/P EST SF 10 MIN: CPT | Performed by: PHYSICIAN ASSISTANT

## 2024-05-20 PROCEDURE — 3074F SYST BP LT 130 MM HG: CPT | Performed by: PHYSICIAN ASSISTANT

## 2024-05-20 ASSESSMENT — PATIENT HEALTH QUESTIONNAIRE - PHQ9: CLINICAL INTERPRETATION OF PHQ2 SCORE: 0

## 2024-05-20 NOTE — PROGRESS NOTES
"Subjective:     CC: strep testing    HPI:   Laury here today with     Daughter has had strep 4 x in the past year  S/p adenoiectomy, still has tonsils intact    Pt requesting testing. She is asymptomatic and afebrile. Denies hx of recurrent strep      ROS:  Gen: no fevers/chills, no changes in weight  Eyes: no changes in vision  ENT: no sore throat, no hearing loss, no bloody nose  Pulm: no sob, no cough  CV: no chest pain, no palpitations  GI: no nausea/vomiting, no diarrhea  : no dysuria  MSk: no myalgias  Skin: no rash  Neuro: no headaches, no numbness/tingling  Heme/Lymph: no easy bruising    Current Outpatient Medications Ordered in Epic   Medication Sig Dispense Refill    Omega-3 Fatty Acids (FISH OIL) 1000 MG Cap capsule Take 1,000 mg by mouth 3 times a day with meals.      Cholecalciferol (VITAMIN D3) 1.25 MG (16938 UT) Cap       Ferrous Sulfate (IRON PO)       Paragard Intrauterine Copper IUD 1 Each by Intrauterine route Once.       No current Epic-ordered facility-administered medications on file.       Objective:     Exam:  /60 (BP Location: Right arm, Patient Position: Sitting, BP Cuff Size: Adult)   Pulse 77   Temp 36.2 °C (97.1 °F) (Temporal)   Resp 18   Ht 1.549 m (5' 1\")   Wt 73 kg (160 lb 15 oz)   SpO2 98%   BMI 30.41 kg/m²  Body mass index is 30.41 kg/m².    Gen: Alert and oriented, No apparent distress.  Neck: Neck is supple without lymphadenopathy.  Lungs: Normal effort, CTA bilaterally, no wheezes, rhonchi, or rales  CV: Regular rate and rhythm. No murmurs, rubs, or gallops.  Ext: No clubbing, cyanosis, edema.    Assessment & Plan:     34 y.o. female with the following -     Problem List Items Addressed This Visit    None  Visit Diagnoses       History of exposure to infectious disease        Relevant Orders    POCT GROUP A STREP, PCR            I spent a total of 10 minutes with record review, exam, communication with the patient, communication with other providers, and " documentation of this encounter.      No follow-ups on file.    Please note that this dictation was created using voice recognition software. I have made every reasonable attempt to correct obvious errors, but there may be errors of grammar and possibly content that I did not discover before finalizing the note.

## 2024-06-03 ENCOUNTER — OFFICE VISIT (OUTPATIENT)
Dept: MEDICAL GROUP | Facility: LAB | Age: 34
End: 2024-06-03
Payer: COMMERCIAL

## 2024-06-03 VITALS
SYSTOLIC BLOOD PRESSURE: 118 MMHG | OXYGEN SATURATION: 96 % | WEIGHT: 158 LBS | TEMPERATURE: 97.4 F | HEIGHT: 61 IN | BODY MASS INDEX: 29.83 KG/M2 | DIASTOLIC BLOOD PRESSURE: 72 MMHG | HEART RATE: 79 BPM

## 2024-06-03 DIAGNOSIS — J02.9 SORE THROAT: ICD-10-CM

## 2024-06-03 LAB — S PYO DNA SPEC NAA+PROBE: NOT DETECTED

## 2024-06-03 PROCEDURE — 99213 OFFICE O/P EST LOW 20 MIN: CPT | Performed by: PHYSICIAN ASSISTANT

## 2024-06-03 PROCEDURE — 3078F DIAST BP <80 MM HG: CPT | Performed by: PHYSICIAN ASSISTANT

## 2024-06-03 PROCEDURE — 3074F SYST BP LT 130 MM HG: CPT | Performed by: PHYSICIAN ASSISTANT

## 2024-06-03 PROCEDURE — 87651 STREP A DNA AMP PROBE: CPT | Performed by: PHYSICIAN ASSISTANT

## 2024-06-03 RX ORDER — METHYLPREDNISOLONE 4 MG/1
TABLET ORAL
Qty: 21 TABLET | Refills: 0 | Status: SHIPPED | OUTPATIENT
Start: 2024-06-03

## 2024-06-03 RX ORDER — CETIRIZINE HYDROCHLORIDE 10 MG/1
CAPSULE, LIQUID FILLED ORAL
COMMUNITY

## 2024-06-03 NOTE — PROGRESS NOTES
"Chief Complaint   Patient presents with    Sinus Problem     Swelling, pressure, and pain X 4 days and keeps getting worse         HPI: Patient is a 34 y.o. female complaining of 4 days of illness including:  Left-sided sinus pressure, left-sided sore throat .   Mucus is: thick.  Similarly ill exposures: yes. Daughter recently had strep  Treatments tried: ibuprofen, zyrtec  She  reports that she has never smoked. She has never used smokeless tobacco..      ROS:  No fever, cough, nausea, changes in bowel movements or skin rash.      I reviewed the patient's medications, allergies and medical history:  Current Outpatient Medications   Medication Sig Dispense Refill    Cetirizine HCl (ZYRTEC ALLERGY) 10 MG Cap       methylPREDNISolone (MEDROL DOSEPAK) 4 MG Tablet Therapy Pack As directed on the packaging label. 21 Tablet 0    Omega-3 Fatty Acids (FISH OIL) 1000 MG Cap capsule Take 1,000 mg by mouth 3 times a day with meals.      Cholecalciferol (VITAMIN D3) 1.25 MG (42652 UT) Cap       Ferrous Sulfate (IRON PO)       Paragard Intrauterine Copper IUD 1 Each by Intrauterine route Once.      Omega-3 Fatty Acids (FISH OIL) 300 MG Cap  (Patient not taking: Reported on 6/3/2024)       No current facility-administered medications for this visit.     Benadryl allergy, Doxycycline, Hazelnuts, Non fat milk, Penicillins, and Promethazine  Past Medical History:   Diagnosis Date    ASTHMA     Migraine     Shingles     History of         EXAM:  /72   Pulse 79   Temp 36.3 °C (97.4 °F) (Temporal)   Ht 1.549 m (5' 1\")   Wt 71.7 kg (158 lb)   SpO2 96%   General: Alert, no conversational dyspnea or audible wheeze, non-toxic appearance.  Eyes: PERRL, conjunctiva slightly injected, no eye discharge.  Ears: Normal pinnae,TM's normal bilaterally.  Nares: Patent with thin mucus.  Sinuses: non tender over maxillary / frontal sinuses.  Throat: Erythematous injection on left side without exudate.   Neck: Supple, with mildly enlarged " cervical nodes.  Lungs: Clear to auscultation bilaterally, no wheeze, crackles or rhonchi.   Heart: Regular rate without murmur.  Skin: Warm and dry without rash.     Latest Reference Range & Units 06/03/24 09:00   POC Group A Strep, PCR Not Detected, Invalid  Not Detected        ASSESSMENT:   1. Sore throat          PLAN:  1. Educated patient that majority of upper respiratory infections are viral and do not need antibiotics.   2. Twice daily use of nasal saline rinse or Neti-Pot.  3. OTC anti-pyretics and decongestants as needed.  4. Follow-up in office or urgent care for worsening symptoms, difficulty breathing, lack of expected recovery, or should new symptoms or problems arise.

## 2024-06-10 DIAGNOSIS — J02.9 SORE THROAT: ICD-10-CM

## 2024-06-10 DIAGNOSIS — Z20.9 HISTORY OF EXPOSURE TO INFECTIOUS DISEASE: ICD-10-CM

## 2024-06-10 RX ORDER — AZITHROMYCIN 250 MG/1
TABLET, FILM COATED ORAL
Qty: 6 TABLET | Refills: 0 | Status: SHIPPED | OUTPATIENT
Start: 2024-06-10 | End: 2024-06-16

## 2025-02-07 ENCOUNTER — HOSPITAL ENCOUNTER (OUTPATIENT)
Dept: RADIOLOGY | Facility: MEDICAL CENTER | Age: 35
End: 2025-02-07
Attending: INTERNAL MEDICINE
Payer: COMMERCIAL

## 2025-02-07 DIAGNOSIS — E04.1 NONTOXIC UNINODULAR GOITER: ICD-10-CM

## 2025-02-07 PROCEDURE — 76536 US EXAM OF HEAD AND NECK: CPT

## 2025-03-05 ENCOUNTER — APPOINTMENT (OUTPATIENT)
Dept: MEDICAL GROUP | Facility: LAB | Age: 35
End: 2025-03-05
Payer: COMMERCIAL

## 2025-03-28 ENCOUNTER — APPOINTMENT (OUTPATIENT)
Dept: MEDICAL GROUP | Facility: LAB | Age: 35
End: 2025-03-28
Payer: COMMERCIAL

## 2025-04-23 ENCOUNTER — HOSPITAL ENCOUNTER (OUTPATIENT)
Dept: RADIOLOGY | Facility: MEDICAL CENTER | Age: 35
End: 2025-04-23
Attending: INTERNAL MEDICINE
Payer: COMMERCIAL

## 2025-04-23 DIAGNOSIS — E04.1 THYROID NODULE: ICD-10-CM

## 2025-04-23 PROCEDURE — 32555 ASPIRATE PLEURA W/ IMAGING: CPT

## 2025-04-23 PROCEDURE — 88173 CYTOPATH EVAL FNA REPORT: CPT | Mod: 26 | Performed by: PATHOLOGY

## 2025-04-23 PROCEDURE — 88173 CYTOPATH EVAL FNA REPORT: CPT | Performed by: PATHOLOGY

## 2025-04-23 NOTE — PROGRESS NOTES
US guided right mid pole thyroid nodule fine needle aspiration done by B[facility]; NON-SEDATION  (no H&P required as this is a NON SEDATION procedure) Right anterior aspect of neck access site, dressing CDI; 3 samples in 1 jar of Cytolyt and 2 sampels in 1 vial Afirma obtained and sent to lab. Pt tolerated the procedure well. Pt hemodynamically stable pre/intra/post procedure; all questions and concerns answered prior to being d/c; patient provided with appropriate education for procedure; pt d/c home.

## 2025-04-24 LAB — CYTOLOGY REG CYTOL: NORMAL

## 2025-04-25 ENCOUNTER — APPOINTMENT (OUTPATIENT)
Dept: MEDICAL GROUP | Facility: LAB | Age: 35
End: 2025-04-25
Payer: COMMERCIAL

## 2025-05-13 ENCOUNTER — APPOINTMENT (OUTPATIENT)
Dept: MEDICAL GROUP | Facility: LAB | Age: 35
End: 2025-05-13
Payer: COMMERCIAL

## 2025-05-27 ENCOUNTER — RESULTS FOLLOW-UP (OUTPATIENT)
Dept: MEDICAL GROUP | Facility: PHYSICIAN GROUP | Age: 35
End: 2025-05-27

## 2025-05-27 ENCOUNTER — HOSPITAL ENCOUNTER (OUTPATIENT)
Facility: MEDICAL CENTER | Age: 35
End: 2025-05-27
Attending: NURSE PRACTITIONER
Payer: COMMERCIAL

## 2025-05-27 ENCOUNTER — OFFICE VISIT (OUTPATIENT)
Dept: MEDICAL GROUP | Facility: PHYSICIAN GROUP | Age: 35
End: 2025-05-27
Payer: COMMERCIAL

## 2025-05-27 VITALS
TEMPERATURE: 97 F | DIASTOLIC BLOOD PRESSURE: 62 MMHG | BODY MASS INDEX: 29.7 KG/M2 | WEIGHT: 161.4 LBS | SYSTOLIC BLOOD PRESSURE: 110 MMHG | OXYGEN SATURATION: 97 % | HEART RATE: 82 BPM | HEIGHT: 62 IN

## 2025-05-27 DIAGNOSIS — Z23 NEED FOR VACCINATION: ICD-10-CM

## 2025-05-27 DIAGNOSIS — N30.01 ACUTE CYSTITIS WITH HEMATURIA: ICD-10-CM

## 2025-05-27 LAB
APPEARANCE UR: CLEAR
BILIRUB UR STRIP-MCNC: NEGATIVE MG/DL
COLOR UR AUTO: YELLOW
GLUCOSE UR STRIP.AUTO-MCNC: NEGATIVE MG/DL
KETONES UR STRIP.AUTO-MCNC: NEGATIVE MG/DL
LEUKOCYTE ESTERASE UR QL STRIP.AUTO: NORMAL
NITRITE UR QL STRIP.AUTO: NEGATIVE
PH UR STRIP.AUTO: 6.5 [PH] (ref 5–8)
PROT UR QL STRIP: NEGATIVE MG/DL
RBC UR QL AUTO: NORMAL
SP GR UR STRIP.AUTO: 1.01
UROBILINOGEN UR STRIP-MCNC: 0.2 MG/DL

## 2025-05-27 PROCEDURE — 81002 URINALYSIS NONAUTO W/O SCOPE: CPT | Performed by: NURSE PRACTITIONER

## 2025-05-27 PROCEDURE — 90677 PCV20 VACCINE IM: CPT | Performed by: NURSE PRACTITIONER

## 2025-05-27 PROCEDURE — 90471 IMMUNIZATION ADMIN: CPT | Performed by: NURSE PRACTITIONER

## 2025-05-27 PROCEDURE — 87086 URINE CULTURE/COLONY COUNT: CPT

## 2025-05-27 PROCEDURE — 3074F SYST BP LT 130 MM HG: CPT | Performed by: NURSE PRACTITIONER

## 2025-05-27 PROCEDURE — 99213 OFFICE O/P EST LOW 20 MIN: CPT | Mod: 25 | Performed by: NURSE PRACTITIONER

## 2025-05-27 PROCEDURE — 3078F DIAST BP <80 MM HG: CPT | Performed by: NURSE PRACTITIONER

## 2025-05-27 RX ORDER — LEVOTHYROXINE SODIUM 25 UG/1
25 TABLET ORAL
COMMUNITY

## 2025-05-27 RX ORDER — SULFAMETHOXAZOLE AND TRIMETHOPRIM 800; 160 MG/1; MG/1
1 TABLET ORAL 2 TIMES DAILY
Qty: 6 TABLET | Refills: 0 | Status: SHIPPED | OUTPATIENT
Start: 2025-05-27 | End: 2025-05-30

## 2025-05-27 ASSESSMENT — ENCOUNTER SYMPTOMS
CHILLS: 0
FEVER: 0

## 2025-05-27 ASSESSMENT — PATIENT HEALTH QUESTIONNAIRE - PHQ9: CLINICAL INTERPRETATION OF PHQ2 SCORE: 0

## 2025-05-27 NOTE — PROGRESS NOTES
"Verbal consent was acquired by the patient to use [employer] ambient listening note generation during this visit Yes      Subjective   Ping Flores is a 35 y.o. female who presents for:  History of Present Illness  The patient presents for evaluation of a suspected urinary tract infection (UTI) and health maintenance.    She reports experiencing intermittent cloudy urine over the past 2 weeks, predominantly in the mornings. Additionally, she describes a recent onset of right lower abdominal pain that started yesterday, accompanied by a persistent urge to urinate. She is uncertain about the presence of hematuria due to potential spotting from her intrauterine device (IUD). She acknowledges inadequate fluid intake and confirms that she is not currently breastfeeding. She does not have a history of recurrent UTIs. She reports no fevers or chills.    She expresses interest in receiving the pneumonia vaccine today and inquires about the necessity of a mammogram and Pap smear.     FAMILY HISTORY  The patient has a family history of breast cancer.    Review of Systems   Constitutional:  Negative for chills and fever.   Genitourinary:  Positive for frequency, pelvic pain (Right lower) and urgency. Negative for hematuria.        Cloudy urine   All other systems reviewed and are negative.    Objective   /62 (BP Location: Right arm, Patient Position: Sitting, BP Cuff Size: Adult)   Pulse 82   Temp 36.1 °C (97 °F) (Temporal)   Ht 1.575 m (5' 2\")   Wt 73.2 kg (161 lb 6.4 oz)   SpO2 97%   Physical Exam  General: Well nourished, well developed female in NAD, awake and conversant.  Eyes: Normal conjunctiva, anicteric.  Round symmetrical pupils.  ENT: Hearing grossly intact.  No nasal discharge.  Neck: Neck is supple.  No masses or thyromegaly.  CV: No lower extremity edema.  Respiratory: Respirations are nonlabored.  No wheezing.  Abdomen: Non-Distended.  Skin: Warm.  No rashes or ulcers.  MSK: Normal ambulation.  " No clubbing or cyanosis.  Neuro: Sensation and CN II-XII grossly normal.  Psych: Alert and oriented.  Cooperative, appropriate mood and affect, normal judgment.    Gastrointestinal: Tenderness in the right lower abdomen    Results  Labs   - Urinalysis: Positive for trace leukocytes and small blood    Assessment & Plan  1. Acute cystitis with hematuria  New to examiner, symptoms ongoing for two weeks. Urinalysis results indicate trace leukocytes and small blood. No fever or chills reported; cloudiness in urine noted. Urine culture ordered; patient advised to check MyChart for results. Bactrim (sulfamethoxazole/trimethoprim) prescribed, 1 tablet twice daily for 3 days; advised to increase water intake.  Educated to complete entire course of antibiotics even if symptoms improve/resolve.  Provided education to drink plenty of fluids, wipe front to back every void and bowel movement.   Return to clinic if symptoms not improving within 3-4 days or in case of vomiting, fever, increasing pain.   - POCT Urinalysis  - URINE CULTURE(NEW); Future  - sulfamethoxazole-trimethoprim (BACTRIM DS) 800-160 MG tablet; Take 1 Tablet by mouth 2 times a day for 3 days.  Dispense: 6 Tablet; Refill: 0    2. Need for vaccination  Pap smear is due. Patient advised to verify mammogram schedule due to family history of breast cancer. Prevnar 20 pneumonia vaccine administered today.  - Pneumococcal Conjugate Vaccine 20-Valent (6 wks+)     Return if symptoms worsen or fail to improve.     I have placed the below orders and discussed them with an approved delegating provider. The MA is performing the below orders under the direction of Dr. Vines.      Please note that this dictation was created using voice recognition software. I have made every reasonable attempt to correct obvious errors, but I expect that there are errors of grammar and possibly content that I did not discover before finalizing the note.

## 2025-05-30 LAB
BACTERIA UR CULT: NORMAL
SIGNIFICANT IND 70042: NORMAL
SITE SITE: NORMAL
SOURCE SOURCE: NORMAL

## 2025-05-31 ENCOUNTER — OFFICE VISIT (OUTPATIENT)
Dept: URGENT CARE | Facility: PHYSICIAN GROUP | Age: 35
End: 2025-05-31
Payer: COMMERCIAL

## 2025-05-31 ENCOUNTER — HOSPITAL ENCOUNTER (OUTPATIENT)
Dept: RADIOLOGY | Facility: MEDICAL CENTER | Age: 35
End: 2025-05-31
Attending: FAMILY MEDICINE
Payer: COMMERCIAL

## 2025-05-31 VITALS
TEMPERATURE: 97.9 F | BODY MASS INDEX: 29 KG/M2 | DIASTOLIC BLOOD PRESSURE: 82 MMHG | SYSTOLIC BLOOD PRESSURE: 130 MMHG | OXYGEN SATURATION: 97 % | RESPIRATION RATE: 20 BRPM | HEIGHT: 62 IN | HEART RATE: 92 BPM | WEIGHT: 157.6 LBS

## 2025-05-31 DIAGNOSIS — R51.9 ACUTE NONINTRACTABLE HEADACHE, UNSPECIFIED HEADACHE TYPE: ICD-10-CM
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
## 2025-05-31 DIAGNOSIS — S13.4XXA WHIPLASH INJURY TO NECK, INITIAL ENCOUNTER: ICD-10-CM

## 2025-05-31 PROCEDURE — 70450 CT HEAD/BRAIN W/O DYE: CPT

## 2025-05-31 PROCEDURE — 72125 CT NECK SPINE W/O DYE: CPT

## 2025-05-31 RX ORDER — KETOROLAC TROMETHAMINE 15 MG/ML
15 INJECTION, SOLUTION INTRAMUSCULAR; INTRAVENOUS ONCE
Status: COMPLETED | OUTPATIENT
Start: 2025-05-31 | End: 2025-05-31

## 2025-05-31 RX ORDER — ACETAMINOPHEN 500 MG
1000 TABLET ORAL ONCE
Status: COMPLETED | OUTPATIENT
Start: 2025-05-31 | End: 2025-05-31

## 2025-05-31 RX ORDER — NAPROXEN 500 MG/1
TABLET ORAL
Qty: 14 TABLET | Refills: 0 | Status: SHIPPED | OUTPATIENT
Start: 2025-05-31

## 2025-05-31 RX ORDER — CYCLOBENZAPRINE HCL 5 MG
5 TABLET ORAL 3 TIMES DAILY PRN
Qty: 30 TABLET | Refills: 0 | Status: SHIPPED | OUTPATIENT
Start: 2025-05-31

## 2025-05-31 RX ADMIN — KETOROLAC TROMETHAMINE 15 MG: 15 INJECTION, SOLUTION INTRAMUSCULAR; INTRAVENOUS at 16:13

## 2025-05-31 RX ADMIN — Medication 1000 MG: at 16:12

## 2025-05-31 ASSESSMENT — ENCOUNTER SYMPTOMS
NECK PAIN: 1
HEADACHES: 1
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
## 2025-08-26 ENCOUNTER — APPOINTMENT (OUTPATIENT)
Dept: MEDICAL GROUP | Facility: PHYSICIAN GROUP | Age: 35
End: 2025-08-26
Payer: COMMERCIAL

## 2025-09-10 ENCOUNTER — APPOINTMENT (OUTPATIENT)
Dept: MEDICAL GROUP | Facility: PHYSICIAN GROUP | Age: 35
End: 2025-09-10
Payer: COMMERCIAL